# Patient Record
Sex: FEMALE | Race: AMERICAN INDIAN OR ALASKA NATIVE
[De-identification: names, ages, dates, MRNs, and addresses within clinical notes are randomized per-mention and may not be internally consistent; named-entity substitution may affect disease eponyms.]

---

## 2017-02-20 ENCOUNTER — HOSPITAL ENCOUNTER (INPATIENT)
Dept: HOSPITAL 5 - ED | Age: 42
LOS: 3 days | Discharge: PSYCHIATRIC OR SUBSTANCE USE FACILITY | DRG: 917 | End: 2017-02-23
Attending: INTERNAL MEDICINE | Admitting: INTERNAL MEDICINE
Payer: MEDICARE

## 2017-02-20 DIAGNOSIS — F23: ICD-10-CM

## 2017-02-20 DIAGNOSIS — Z82.49: ICD-10-CM

## 2017-02-20 DIAGNOSIS — G92: ICD-10-CM

## 2017-02-20 DIAGNOSIS — I10: ICD-10-CM

## 2017-02-20 DIAGNOSIS — Z90.710: ICD-10-CM

## 2017-02-20 DIAGNOSIS — E87.6: ICD-10-CM

## 2017-02-20 DIAGNOSIS — T50.902A: Primary | ICD-10-CM

## 2017-02-20 DIAGNOSIS — Z83.3: ICD-10-CM

## 2017-02-20 LAB
ALBUMIN SERPL-MCNC: 4.4 G/DL (ref 3.9–5)
ALBUMIN/GLOB SERPL: 1.5 %
ALP SERPL-CCNC: 64 UNITS/L (ref 35–129)
ALT SERPL-CCNC: 20 UNITS/L (ref 7–56)
ANION GAP SERPL CALC-SCNC: 20 MMOL/L
BASOPHILS NFR BLD AUTO: 0.4 % (ref 0–1.8)
BILIRUB SERPL-MCNC: 0.8 MG/DL (ref 0.1–1.2)
BILIRUB UR QL STRIP: (no result)
BLOOD UR QL VISUAL: (no result)
BUN SERPL-MCNC: 11 MG/DL (ref 7–17)
BUN/CREAT SERPL: 13.75 %
CALCIUM SERPL-MCNC: 9.8 MG/DL (ref 8.4–10.2)
CHLORIDE SERPL-SCNC: 94.6 MMOL/L (ref 98–107)
CO2 SERPL-SCNC: 27 MMOL/L (ref 22–30)
EOSINOPHIL NFR BLD AUTO: 0.1 % (ref 0–4.3)
GLUCOSE SERPL-MCNC: 120 MG/DL (ref 65–100)
HCT VFR BLD CALC: 42.9 % (ref 30.3–42.9)
HGB BLD-MCNC: 14 GM/DL (ref 10.1–14.3)
KETONES UR STRIP-MCNC: (no result) MG/DL
LEUKOCYTE ESTERASE UR QL STRIP: (no result)
MAGNESIUM SERPL-MCNC: 1.9 MG/DL (ref 1.7–2.3)
MCH RBC QN AUTO: 29 PG (ref 28–32)
MCHC RBC AUTO-ENTMCNC: 33 % (ref 30–34)
MCV RBC AUTO: 88 FL (ref 79–97)
MUCOUS THREADS #/AREA URNS HPF: (no result) /HPF
NITRITE UR QL STRIP: (no result)
PH UR STRIP: 6 [PH] (ref 5–7)
PLATELET # BLD: 407 K/MM3 (ref 140–440)
POTASSIUM SERPL-SCNC: 3.1 MMOL/L (ref 3.6–5)
PROT SERPL-MCNC: 7.4 G/DL (ref 6.3–8.2)
PROT UR STRIP-MCNC: (no result) MG/DL
RBC # BLD AUTO: 4.86 M/MM3 (ref 3.65–5.03)
RBC #/AREA URNS HPF: 1 /HPF (ref 0–6)
SODIUM SERPL-SCNC: 138 MMOL/L (ref 137–145)
URINE DRUGS OF ABUSE NOTE: (no result)
UROBILINOGEN UR-MCNC: < 2 MG/DL (ref ?–2)
WBC # BLD AUTO: 6.8 K/MM3 (ref 4.5–11)
WBC #/AREA URNS HPF: 1 /HPF (ref 0–6)

## 2017-02-20 PROCEDURE — 82140 ASSAY OF AMMONIA: CPT

## 2017-02-20 PROCEDURE — 90686 IIV4 VACC NO PRSV 0.5 ML IM: CPT

## 2017-02-20 PROCEDURE — 80048 BASIC METABOLIC PNL TOTAL CA: CPT

## 2017-02-20 PROCEDURE — 80307 DRUG TEST PRSMV CHEM ANLYZR: CPT

## 2017-02-20 PROCEDURE — 80320 DRUG SCREEN QUANTALCOHOLS: CPT

## 2017-02-20 PROCEDURE — 36415 COLL VENOUS BLD VENIPUNCTURE: CPT

## 2017-02-20 PROCEDURE — 83735 ASSAY OF MAGNESIUM: CPT

## 2017-02-20 PROCEDURE — 80053 COMPREHEN METABOLIC PANEL: CPT

## 2017-02-20 PROCEDURE — 84443 ASSAY THYROID STIM HORMONE: CPT

## 2017-02-20 PROCEDURE — 87040 BLOOD CULTURE FOR BACTERIA: CPT

## 2017-02-20 PROCEDURE — 96374 THER/PROPH/DIAG INJ IV PUSH: CPT

## 2017-02-20 PROCEDURE — 81001 URINALYSIS AUTO W/SCOPE: CPT

## 2017-02-20 PROCEDURE — 93005 ELECTROCARDIOGRAM TRACING: CPT

## 2017-02-20 PROCEDURE — 82550 ASSAY OF CK (CPK): CPT

## 2017-02-20 PROCEDURE — 84703 CHORIONIC GONADOTROPIN ASSAY: CPT

## 2017-02-20 PROCEDURE — 85025 COMPLETE CBC W/AUTO DIFF WBC: CPT

## 2017-02-20 PROCEDURE — 82962 GLUCOSE BLOOD TEST: CPT

## 2017-02-20 PROCEDURE — 96375 TX/PRO/DX INJ NEW DRUG ADDON: CPT

## 2017-02-20 PROCEDURE — 93010 ELECTROCARDIOGRAM REPORT: CPT

## 2017-02-20 PROCEDURE — 96361 HYDRATE IV INFUSION ADD-ON: CPT

## 2017-02-20 PROCEDURE — G0480 DRUG TEST DEF 1-7 CLASSES: HCPCS

## 2017-02-20 PROCEDURE — 70450 CT HEAD/BRAIN W/O DYE: CPT

## 2017-02-20 RX ADMIN — DOCUSATE SODIUM SCH: 100 CAPSULE, LIQUID FILLED ORAL at 23:03

## 2017-02-20 RX ADMIN — FAMOTIDINE SCH: 20 TABLET ORAL at 23:04

## 2017-02-20 NOTE — HISTORY AND PHYSICAL REPORT
History of Present Illness


Date of examination: 02/20/17


Date of admission: 





02/2017


Chief complaint: 





Altered mental status


History of present illness: 





The patient is a 41-year-old female who presents via EMS for evaluation of 

altered mental status.  The patient's  was at bedside and history 

obtained from him.  He states that the patient was found to have altered mental 

status by law enforcement after pulling her vehicle to the side of the road 

earlier today.  She subsequently passed out and became unresponsive.  Law force 

went inform the patient's  and they suspected the patient overdosed on 

prescription pills.  The  states that the patient reported homicidal and 

suicidal ideations during the past 48 hours, and that she has a history of 

homicidal ideations and multiple suicide attempt in the past. In the Er she 

remained in deep sleep and very difficult to arouse to get any history. CT head 

was unremarkable and UDS was negative.





Past medical History: h/o schizophrenia, HTN, multiple suicidal attempt in the 

past.





Past surgical History: s/p hysterectomy





Social History: Lives with family, denies any smoking, drinking and elicit drug 

abuse.





Family History: Significant for DM and HTN in mother





Medications and Allergies


 Allergies











Allergy/AdvReac Type Severity Reaction Status Date / Time


 


No Known Allergies Allergy   Unverified 12/31/15 09:34











 Home Medications











 Medication  Instructions  Recorded  Confirmed  Last Taken  Type


 


Benztropine [Cogentin] 2 mg PO DAILY 02/20/17 02/20/17 Unknown History


 


Hydrochlorothiazide [HCTZ] 25 mg PO QDAY 02/20/17 02/20/17 Unknown History


 


LORazepam [Ativan] 1 mg PO BID 02/20/17 02/20/17 Unknown History


 


Olanzapine [OLANZapine] 5 mg PO DAILY 02/20/17 02/20/17 Unknown History


 


Perphenazine 4 mg PO DAILY 02/20/17 02/20/17 Unknown History














Review of Systems


ROS unobtainable: due to mental status





Exam





- Physical Exam


Narrative exam: 





GENERAL: This is well-developed well-nourished AAF lying on bed appeared to be 

in no discomfort. Nonverbal with close eyes.


HEENT: Normocephalic.  Atraumatic. External auditory canal and nares patent 

bilaterally.


NECK: Supple.  Trachea midline. No JVD, thyromagaly or lymphadenopathy.


CHEST/LUNGS: Clear to auscultated bilaterally.  There is no respiratory 

distress noted, breathing nonlabored. No wheezes crackles or rhonchi.


HEART/CARDIOVASCULAR: Regular in rate and rhythm.  PMI at the apex.  There is 

no gallop rub or murmur.


ABDOMEN: Abdomen is soft, nontender.  Patient has normal bowel sounds.  There 

is no abdominal distention. No organomagaly or rigidity.


SKIN: There is no rash,  no erythrema.  There is no diaphoresis. Warm and dry.


NEUROLOGY: uncooperative and remained nonverbal with closed eyes


MUSCULOSKELETAL: No joint effusion or tenderness.  No muscle wasting. 


EXTRIMITY: No edema, cyanosis or clubbing.


PSYCH: does not answer any question.





- Constitutional


Vitals: 


 











Temp Pulse Resp BP Pulse Ox


 


 99.4 F   121 H  11 L  147/77   99 


 


 02/20/17 14:59  02/20/17 17:03  02/20/17 17:03  02/20/17 17:03  02/20/17 17:03














Results





- Labs


CBC & Chem 7: 


 02/21/17 05:35





 02/21/17 05:35


Labs: 


 Laboratory Last Values











WBC  6.8 K/mm3 (4.5-11.0)   02/20/17  16:06    


 


RBC  4.86 M/mm3 (3.65-5.03)   02/20/17  16:06    


 


Hgb  14.0 gm/dl (10.1-14.3)   02/20/17  16:06    


 


Hct  42.9 % (30.3-42.9)   02/20/17  16:06    


 


MCV  88 fl (79-97)   02/20/17  16:06    


 


MCH  29 pg (28-32)   02/20/17  16:06    


 


MCHC  33 % (30-34)   02/20/17  16:06    


 


RDW  13.5 % (13.2-15.2)   02/20/17  16:06    


 


Plt Count  407 K/mm3 (140-440)   02/20/17  16:06    


 


Lymph % (Auto)  21.3 % (13.4-35.0)   02/20/17  16:06    


 


Mono % (Auto)  10.6 % (0.0-7.3)  H  02/20/17  16:06    


 


Eos % (Auto)  0.1 % (0.0-4.3)   02/20/17  16:06    


 


Baso % (Auto)  0.4 % (0.0-1.8)   02/20/17  16:06    


 


Lymph #  1.5 K/mm3 (1.2-5.4)   02/20/17  16:06    


 


Mono #  0.7 K/mm3 (0.0-0.8)   02/20/17  16:06    


 


Eos #  0.0 K/mm3 (0.0-0.4)   02/20/17  16:06    


 


Baso #  0.0 K/mm3 (0.0-0.1)   02/20/17  16:06    


 


Seg Neutrophils %  67.6 % (40.0-70.0)   02/20/17  16:06    


 


Seg Neutrophils #  4.6 K/mm3 (1.8-7.7)   02/20/17  16:06    


 


Sodium  138 mmol/L (137-145)   02/20/17  16:06    


 


Potassium  3.1 mmol/L (3.6-5.0)  L  02/20/17  16:06    


 


Chloride  94.6 mmol/L ()  L  02/20/17  16:06    


 


Carbon Dioxide  27 mmol/L (22-30)   02/20/17  16:06    


 


Anion Gap  20 mmol/L  02/20/17  16:06    


 


BUN  11 mg/dL (7-17)   02/20/17  16:06    


 


Creatinine  0.8 mg/dL (0.7-1.2)   02/20/17  16:06    


 


Estimated GFR  > 60 ml/min  02/20/17  16:06    


 


BUN/Creatinine Ratio  13.75 %  02/20/17  16:06    


 


Glucose  120 mg/dL ()  H  02/20/17  16:06    


 


Lactic Acid  1.6 mmol/L (0.7-2.0)   02/20/17  16:06    


 


Calcium  9.8 mg/dL (8.4-10.2)   02/20/17  16:06    


 


Magnesium  1.9 mg/dL (1.7-2.3)   02/20/17  16:06    


 


Total Bilirubin  0.8 mg/dL (0.1-1.2)   02/20/17  16:06    


 


AST  27 units/L (5-40)   02/20/17  16:06    


 


ALT  20 units/L (7-56)   02/20/17  16:06    


 


Alkaline Phosphatase  64 units/L ()   02/20/17  16:06    


 


Total Protein  7.4 g/dL (6.3-8.2)   02/20/17  16:06    


 


Albumin  4.4 g/dL (3.9-5)   02/20/17  16:06    


 


Albumin/Globulin Ratio  1.5 %  02/20/17  16:06    


 


TSH  0.712 mlU/mL (0.270-4.200)   02/20/17  16:06    


 


HCG, Qual  Negative  (Negative)   02/20/17  16:06    


 


Urine Color  Yellow  (Yellow)   02/20/17  18:16    


 


Urine Turbidity  Clear  (Clear)   02/20/17  18:16    


 


Urine pH  6.0  (5.0-7.0)   02/20/17  18:16    


 


Ur Specific Gravity  1.011  (1.003-1.030)   02/20/17  18:16    


 


Urine Protein  <15 mg/dl mg/dL (Negative)   02/20/17  18:16    


 


Urine Glucose (UA)  Neg mg/dL (Negative)   02/20/17  18:16    


 


Urine Ketones  Tr mg/dL (Negative)   02/20/17  18:16    


 


Urine Blood  Neg  (Negative)   02/20/17  18:16    


 


Urine Nitrite  Neg  (Negative)   02/20/17  18:16    


 


Urine Bilirubin  Neg  (Negative)   02/20/17  18:16    


 


Urine Urobilinogen  < 2.0 mg/dL (<2.0)   02/20/17  18:16    


 


Ur Leukocyte Esterase  Neg  (Negative)   02/20/17  18:16    


 


Urine WBC (Auto)  1.0 /HPF (0.0-6.0)   02/20/17  18:16    


 


Urine RBC (Auto)  1.0 /HPF (0.0-6.0)   02/20/17  18:16    


 


U Epithel Cells (Auto)  < 1.0 /HPF (0-13.0)   02/20/17  18:16    


 


Urine Mucus  Few /HPF  02/20/17  18:16    


 


Salicylates  < 0.3 mg/dL (2.8-20.0)  L  02/20/17  16:06    


 


Urine Opiates Screen  Presumptive negative   02/20/17  18:16    


 


Urine Methadone Screen  Presumptive negative   02/20/17  18:16    


 


Acetaminophen  < 15.0 ug/mL (10.0-30.0)   02/20/17  16:06    


 


Ur Barbiturates Screen  Presumptive negative   02/20/17  18:16    


 


Ur Phencyclidine Scrn  Presumptive negative   02/20/17  18:16    


 


Ur Amphetamines Screen  Presumptive negative   02/20/17  18:16    


 


U Benzodiazepines Scrn  Presumptive negative   02/20/17  18:16    


 


Urine Cocaine Screen  Presumptive negative   02/20/17  18:16    


 


U Marijuana (THC) Screen  Presumptive negative   02/20/17  18:16    


 


Drugs of Abuse Note  Disclamer   02/20/17  18:16    


 


Plasma/Serum Alcohol  < 0.01 gm% (0-0.07)   02/20/17  16:06    














- Imaging and Cardiology


CT Scan - head: report reviewed





Assessment and Plan


Assessment and plan: 





Acute encephalopathy likely toxic


History of schizophrenia


History of multiple suicidal attempts in the past


Possible drug overdose


Hypokalemia, likley due to poor intake





Plan:


Admit to medicine


Place on IV fluid hydration and supportive care


Mental health consultation


Place on 1013


GI and DVT prophylaxis


replace electrolytes








Advance Directives: Yes


Plan of care discussed with patient/family: Yes

## 2017-02-20 NOTE — ADMIT CRITERIA FORM
<FERNANDO AREVALO - Last Filed: 02/20/17 17:46>


Admission Criteria Documentation: 





                                           MENTAL STATUS CHANGE





   Clinical Indications for Inpatient Care                                     

                                    


                                                                 (Place 'X' for 

any and all applicable criteria):  





Ongoing inpatient care may be needed for ANY ONE of the following(1)(2)(3)(5)(6)

: 





[X ]I.    Suspected serious etiology (eg, medical disorder, CNS event) of 

mental status change


[ X]II.   Danger to self or others not manageable at lower level of care


[ ]III.  Grave disability (eg, inability to perform self care necessary at 

lower level of care)


[ ]IV. Agitation or inappropriate behavior interfering with care for primary 

condition (eg, attempting to discontinue


        lines or drains prematurely, unable to cooperate with respiratory care)


[ ]V.  Delirium [A] [D][E] as described by ANY ONE of the following(26):


         [ ]a)  Delirium due to alcohol or sedative [F] withdrawal


         [ ]b)  Delirium of uncertain etiology that has not responded to 

appropriate empiric treatment


         [ ]c)  Delirium that prevents performance of a life-sustaining 

function (eg, feeding or hydrating oneself)





[ X]VI.  General contraindications and/or Inappropriate clinical situations for 

Observational Care in patients


          with Mental Status Change, when ANY ONE of the following is required:


          [ X]a)   Prediction of prolongation of LOS based on ANY ONE of the 

following may be considered as 


                    a contraindication for observational care 2, 3, 4, 5, 6, 7, 

8, 9, 10, 11


                    [ ]i)    Age > 65 yrs.


                    [ X]ii)   Patient arriving by ambulance


                    [ ]iii)  Patient with high acuity


                    [ ]iv)  Patient requiring vital sign monitoring


                    [ ]v)   Patient on IV medication


          [ ]b)   Systolic blood pressures  180mmHg 3,12


          [ ]c)   Patient with altered mental status including delirium and 

other alteration of consciousness, (3)


          [ ]d)   Patient whose discharge disposition will be to a skilled 

nursing home or rehabilitation home should 


                   not be managed in Emergency Department Observation Unit. CMS 

rule requires 3 days hospital stay before such placement.3,13


          [ ]e)   Patient with failure to thrive due to broad array of 

etiologies 3,16,17


          [ ]f)   Inability to ambulate 3,14








Extended stay beyond goal length of stay for the primary condition may be 

needed until ALL of the following are present(3)(5):


[ ]a)  Underlying medical etiology of mental status change is absent, or has 

been established and adequately treated


[ ]b)  Danger to self or others is absent or manageable at lower level of care.


[ ]c)  Behavior crisis management, including physical or chemical restraints, 

is not required or available at lower level of car


[ ]d)  Substance or alcohol withdrawal is absent or manageable at lower level 

of care.


[ ]e)  Behavioral symptoms (eg, agitation, somnolence, inappropriate behavior) 

are absent, or are manageable at lower level of care.








The original [facility] [employer] content created by [facility] has been revised. 


The portions of the content which have been revised are identified through the 

use of italic text or in bold, and [facility] 


has neither reviewed nor approved the modified material. All other unmodified 

content is copyright  [facility].





Please see references footnoted in the original [facility] edition 

2016


Admission Criteria Met: Yes





<OLIVIA REYNOSO - Last Filed: 02/20/17 18:48>


Admission Criteria Met: Yes

## 2017-02-20 NOTE — CAT SCAN REPORT
FINAL REPORT



EXAM:  CT HEAD/BRAIN WO CON



HISTORY:  headache 



TECHNIQUE:  Noncontrast serial axial images from skull base to

vertex. 



PRIORS:  None.



FINDINGS:  

There is no mass effect or midline shift.  There are no abnormal

intra or extra-axial fluid collections.  Cortical sulci and

lateral ventricles are within normal limits for size and

configuration.  Basilar cisterns are patent. No acute

intracranial hemorrhage is identified. 



Visualized paranasal sinuses and mastoid air cells are well

aerated. 



No acute osseous abnormality is identified. 



IMPRESSION:  

1. No abnormal mass or acute intracranial hemorrhage is

identified.

## 2017-02-20 NOTE — ADMIT CRITERIA FORM
Admission Criteria Documentation: 





                                           MENTAL STATUS CHANGE





   Clinical Indications for Inpatient Care                                     

                                    


                                                                 (Place 'X' for 

any and all applicable criteria):  





Ongoing inpatient care may be needed for ANY ONE of the following(1)(2)(3)(5)(6)

: 





[X ]I.    Suspected serious etiology (eg, medical disorder, CNS event) of 

mental status change


[X ]II.   Danger to self or others not manageable at lower level of care


[ ]III.  Grave disability (eg, inability to perform self care necessary at 

lower level of care)


[ ]IV. Agitation or inappropriate behavior interfering with care for primary 

condition (eg, attempting to discontinue


        lines or drains prematurely, unable to cooperate with respiratory care)


[ ]V.  Delirium [A] [D][E] as described by ANY ONE of the following(26):


         [ ]a)  Delirium due to alcohol or sedative [F] withdrawal


         [ ]b)  Delirium of uncertain etiology that has not responded to 

appropriate empiric treatment


         [ ]c)  Delirium that prevents performance of a life-sustaining 

function (eg, feeding or hydrating oneself)





[X ]VI.  General contraindications and/or Inappropriate clinical situations for 

Observational Care in patients


          with Mental Status Change, when ANY ONE of the following is required:


          [X ]a)   Prediction of prolongation of LOS based on ANY ONE of the 

following may be considered as 


                    a contraindication for observational care 2, 3, 4, 5, 6, 7, 

8, 9, 10, 11


                    [ ]i)    Age > 65 yrs.


                    [X ]ii)   Patient arriving by ambulance


                    [ ]iii)  Patient with high acuity


                    [ ]iv)  Patient requiring vital sign monitoring


                    [ ]v)   Patient on IV medication


          [ ]b)   Systolic blood pressures  180mmHg 3,12


          [ ]c)   Patient with altered mental status including delirium and 

other alteration of consciousness, (3)


          [ ]d)   Patient whose discharge disposition will be to a skilled 

nursing home or rehabilitation home should 


                   not be managed in Emergency Department Observation Unit. CMS 

rule requires 3 days hospital stay before such placement.3,13


          [ ]e)   Patient with failure to thrive due to broad array of 

etiologies 3,16,17


          [ ]f)   Inability to ambulate 3,14








Extended stay beyond goal length of stay for the primary condition may be 

needed until ALL of the following are present(3)(5):


[ ]a)  Underlying medical etiology of mental status change is absent, or has 

been established and adequately treated


[ ]b)  Danger to self or others is absent or manageable at lower level of care.


[ ]c)  Behavior crisis management, including physical or chemical restraints, 

is not required or available at lower level of car


[ ]d)  Substance or alcohol withdrawal is absent or manageable at lower level 

of care.


[ ]e)  Behavioral symptoms (eg, agitation, somnolence, inappropriate behavior) 

are absent, or are manageable at lower level of care.








The original [facility] [employer] content created by [facility] 

[employer] has been revised. 


The portions of the content which have been revised are identified through the 

use of italic text or in bold, and [facility] 


has neither reviewed nor approved the modified material. All other unmodified 

content is copyright  [facility].





Please see references footnoted in the original [facility] edition 

2016


Admission Criteria Met: Yes

## 2017-02-20 NOTE — EMERGENCY DEPARTMENT REPORT
HPI





- General


Chief Complaint: Altered Mental Status


Time Seen by Provider: 02/20/17 15:53





- HPI


HPI: 





The patient is a 41-year-old female who presents via EMS for evaluation of 

mental status.  The patient's  provides history present illness.  He 

states that she was informed that the patient was found to have altered mental 

status by law enforcement after pulling her vehicle to the side of the road 

earlier today.  She subsequently passed out and became unresponsive.  Law force 

went inform the patient's  and they suspected the patient overdosed on 

prescription pills.  The  states that the patient reported homicidal 

ideations during the past 48 hours, and that she has a history of homicidal 

ideations and suicide attempt in the past. 








ED Past Medical Hx





- Social History


Smoking Status: Unknown if ever smoked





ED Review of Systems


ROS: 


Stated complaint: ALTERED LOC/POSSIBLE DRUGOVERDOSE


Other details as noted in HPI





Comment: Unobtainable due to pts medical conditions (unresponsive)





Physical Exam





- Physical Exam


Vital Signs: 


 Vital Signs











  02/20/17





  14:59


 


Temperature 99.4 F


 


Pulse Rate 125 H


 


Respiratory 22





Rate 


 


Blood Pressure 154/91


 


O2 Sat by Pulse 100





Oximetry 











Physical Exam: 








General: well-nourished, well-developed, no acute distress


Head: Normocephalic, atraumatic


Eyes: normal sclera


ENT: Mucous membranes are pale and dry


Neck: trachea midline, neck supple, No neck stiffness, no cervical adenopathy


Respiratory: Breath sounds equal bilaterally, no wheezing, rales, or rhonchi


Cardio: S1 and S2 present, no murmurs, rubs, gallops, capillary refill is 

delayed


Abdomen: Normoactive bowel sounds, soft abdomen, no rigidity, no guarding or 

rebound tenderness


Musc: No pitting edema


Skin: No rash


Neuro: Unresponsive to verbal stimuli, patient responsive to tactile stimuli, 

no facial drooping, gag reflex intact, patient protecting airway, reflexes 2+ 

symmetric on DTR testing, no obvious gross neuro deficits


Psych: Normal affect











ED Course


 Vital Signs











  02/20/17





  14:59


 


Temperature 99.4 F


 


Pulse Rate 125 H


 


Respiratory 22





Rate 


 


Blood Pressure 154/91


 


O2 Sat by Pulse 100





Oximetry 














ED Medical Decision Making





- Lab Data


Result diagrams: 


 02/20/17 16:06





 02/20/17 16:06





- Medical Decision Making





The patient was seen and examined by myself.  The patient is placed on a 

cardiac monitor and continuous pulse ox. On initial evaluation, the patient was 

found to be in no distress. Evaluation orders were placed.  The patient is 

given 2 L normal saline fluid bolus for treatment of dehydration and 

tachycardia.  The patient was given Narcan with minimal and transient 

improvement in drowsiness.  EKG exhibited sinus tachycardia with normal axis, 

normal rhythm, no QT interval prolongation.  Lab results were nonrevealing, 

including negative Tylenol and salicylate levels.  The patient was monitored in 

the emergency department for greater than 2 hours.  The patient remains with 

severe CNS depression, she'll be admitted for close cardiopulmonary monitoring. 

The on-call hospitalist service was contacted.  They agreed to admit the 

patient for further treatment and close monitoring.  The ED admit order was 

placed.  The patient was admitted in guarded condition.





Critical care attestation.: 


If time is entered above; I have spent that time in minutes in the direct care 

of this critically ill patient, excluding procedure time.








ED Disposition


Clinical Impression: 


 Dehydration, Suicidal behavior





Altered mental status


Qualifiers:


 Altered mental status type: stupor Qualified Code(s): R40.1 - Stupor





Overdose


Qualifiers:


 Encounter type: initial encounter Injury intent: undetermined intent Qualified 

Code(s): T50.904A - Poisoning by unspecified drugs, medicaments and biological 

substances, undetermined, initial encounter





Disposition: OP ADMITTED AS IP TO THIS HOSP


Is pt being admited?: Yes


Does the pt Need Aspirin: Yes


Condition: Serious


Referrals: 


PRIMARY CARE,MD [Primary Care Provider] - 3-5 Days


Time of Disposition: 17:02

## 2017-02-21 LAB
ANION GAP SERPL CALC-SCNC: 18 MMOL/L
BASOPHILS NFR BLD AUTO: 0.7 % (ref 0–1.8)
BUN SERPL-MCNC: 7 MG/DL (ref 7–17)
BUN/CREAT SERPL: 11.66 %
CALCIUM SERPL-MCNC: 8.3 MG/DL (ref 8.4–10.2)
CHLORIDE SERPL-SCNC: 103 MMOL/L (ref 98–107)
CO2 SERPL-SCNC: 23 MMOL/L (ref 22–30)
EOSINOPHIL NFR BLD AUTO: 0.2 % (ref 0–4.3)
GLUCOSE SERPL-MCNC: 123 MG/DL (ref 65–100)
HCT VFR BLD CALC: 36.9 % (ref 30.3–42.9)
HGB BLD-MCNC: 12.4 GM/DL (ref 10.1–14.3)
MCH RBC QN AUTO: 29 PG (ref 28–32)
MCHC RBC AUTO-ENTMCNC: 34 % (ref 30–34)
MCV RBC AUTO: 87 FL (ref 79–97)
PLATELET # BLD: 388 K/MM3 (ref 140–440)
POTASSIUM SERPL-SCNC: 3.3 MMOL/L (ref 3.6–5)
RBC # BLD AUTO: 4.24 M/MM3 (ref 3.65–5.03)
SODIUM SERPL-SCNC: 141 MMOL/L (ref 137–145)
WBC # BLD AUTO: 8.4 K/MM3 (ref 4.5–11)

## 2017-02-21 RX ADMIN — LORAZEPAM PRN MG: 2 INJECTION INTRAMUSCULAR; INTRAVENOUS at 22:54

## 2017-02-21 RX ADMIN — FAMOTIDINE SCH MG: 20 TABLET ORAL at 10:24

## 2017-02-21 RX ADMIN — DOCUSATE SODIUM SCH MG: 100 CAPSULE, LIQUID FILLED ORAL at 10:24

## 2017-02-21 RX ADMIN — SODIUM CHLORIDE AND POTASSIUM CHLORIDE SCH MLS/HR: 9; 2.98 INJECTION, SOLUTION INTRAVENOUS at 06:52

## 2017-02-21 RX ADMIN — DOCUSATE SODIUM SCH: 100 CAPSULE, LIQUID FILLED ORAL at 22:55

## 2017-02-21 RX ADMIN — FAMOTIDINE SCH: 20 TABLET ORAL at 22:54

## 2017-02-21 RX ADMIN — SODIUM CHLORIDE AND POTASSIUM CHLORIDE SCH MLS/HR: 9; 2.98 INJECTION, SOLUTION INTRAVENOUS at 18:29

## 2017-02-21 RX ADMIN — ENOXAPARIN SODIUM SCH MG: 100 INJECTION SUBCUTANEOUS at 10:24
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
## 2017-02-21 NOTE — PROGRESS NOTE
Assessment and Plan


Assessment and plan: 





Acute encephalopathy likely toxic, improved


Acute psychosis


Drug overdose for suicidal attempt


History of schizophrenia


History of multiple suicidal attempts in the past


Hypokalemia, due to poor oral intake








Plan:


continue to monitor


cont on IV fluid hydration and supportive care


follow Mental health recommendation


cont on 1013


GI and DVT prophylaxis


replace electrolyte as needed











History


Interval history: 





Patient seen and examined.  Medical records and medication list reviewed.  


No acute event overnight noted by the RN.  


Patient on restrain, mental status improved per family but still uncooperative 

and remained nonverbal with closed eyes


Discussed plan of care at bedside with patient's family.





Hospitalist Physical





- Physical exam


Narrative exam: 





GENERAL: This is well-developed well-nourished AAF lying on bed appeared to be 

in no discomfort. Nonverbal with close eyes.


HEENT: Normocephalic.  Atraumatic. External auditory canal and nares patent 

bilaterally.


NECK: Supple.  Trachea midline. No JVD, thyromagaly or lymphadenopathy.


CHEST/LUNGS: Clear to auscultated bilaterally.  There is no respiratory 

distress noted, breathing nonlabored. No wheezes crackles or rhonchi.


HEART/CARDIOVASCULAR: Regular in rate and rhythm.  PMI at the apex.  There is 

no gallop rub or murmur.


ABDOMEN: Abdomen is soft, nontender.  Patient has normal bowel sounds.  There 

is no abdominal distention. No organomagaly or rigidity.


SKIN: There is no rash,  no erythrema.  There is no diaphoresis. Warm and dry.


NEUROLOGY: uncooperative and remained nonverbal with closed eyes


MUSCULOSKELETAL: No joint effusion or tenderness.  No muscle wasting. 


EXTRIMITY: No edema, cyanosis or clubbing.


PSYCH: does not answer any question.





- Constitutional


Vitals: 


 











Temp Pulse Resp BP Pulse Ox


 


 97.3 F L  121 H  20   185/95   98 


 


 02/21/17 07:25  02/21/17 12:00  02/21/17 12:00  02/21/17 12:00  02/21/17 12:00














Results





- Labs


CBC & Chem 7: 


 02/21/17 05:35





 02/21/17 05:35


Labs: 


 Laboratory Last Values











WBC  8.4 K/mm3 (4.5-11.0)   02/21/17  05:35    


 


RBC  4.24 M/mm3 (3.65-5.03)   02/21/17  05:35    


 


Hgb  12.4 gm/dl (10.1-14.3)   02/21/17  05:35    


 


Hct  36.9 % (30.3-42.9)  D 02/21/17  05:35    


 


MCV  87 fl (79-97)   02/21/17  05:35    


 


MCH  29 pg (28-32)   02/21/17  05:35    


 


MCHC  34 % (30-34)   02/21/17  05:35    


 


RDW  13.3 % (13.2-15.2)   02/21/17  05:35    


 


Plt Count  388 K/mm3 (140-440)   02/21/17  05:35    


 


Lymph % (Auto)  16.4 % (13.4-35.0)   02/21/17  05:35    


 


Mono % (Auto)  9.2 % (0.0-7.3)  H  02/21/17  05:35    


 


Eos % (Auto)  0.2 % (0.0-4.3)   02/21/17  05:35    


 


Baso % (Auto)  0.7 % (0.0-1.8)   02/21/17  05:35    


 


Lymph #  1.4 K/mm3 (1.2-5.4)   02/21/17  05:35    


 


Mono #  0.8 K/mm3 (0.0-0.8)   02/21/17  05:35    


 


Eos #  0.0 K/mm3 (0.0-0.4)   02/21/17  05:35    


 


Baso #  0.1 K/mm3 (0.0-0.1)   02/21/17  05:35    


 


Seg Neutrophils %  73.5 % (40.0-70.0)  H  02/21/17  05:35    


 


Seg Neutrophils #  6.2 K/mm3 (1.8-7.7)   02/21/17  05:35    


 


Sodium  141 mmol/L (137-145)   02/21/17  05:35    


 


Potassium  3.3 mmol/L (3.6-5.0)  L  02/21/17  05:35    


 


Chloride  103.0 mmol/L ()   02/21/17  05:35    


 


Carbon Dioxide  23 mmol/L (22-30)   02/21/17  05:35    


 


Anion Gap  18 mmol/L  02/21/17  05:35    


 


BUN  7 mg/dL (7-17)   02/21/17  05:35    


 


Creatinine  0.6 mg/dL (0.7-1.2)  L  02/21/17  05:35    


 


Estimated GFR  > 60 ml/min  02/21/17  05:35    


 


BUN/Creatinine Ratio  11.66 %  02/21/17  05:35    


 


Glucose  123 mg/dL ()  H  02/21/17  05:35    


 


Lactic Acid  1.7 mmol/L (0.7-2.0)   02/20/17  21:04    


 


Calcium  8.3 mg/dL (8.4-10.2)  L D 02/21/17  05:35    


 


Magnesium  1.9 mg/dL (1.7-2.3)   02/20/17  16:06    


 


Total Bilirubin  0.8 mg/dL (0.1-1.2)   02/20/17  16:06    


 


AST  27 units/L (5-40)   02/20/17  16:06    


 


ALT  20 units/L (7-56)   02/20/17  16:06    


 


Alkaline Phosphatase  64 units/L ()   02/20/17  16:06    


 


Total Creatine Kinase  316 units/L ()  H  02/20/17  21:04    


 


Total Protein  7.4 g/dL (6.3-8.2)   02/20/17  16:06    


 


Albumin  4.4 g/dL (3.9-5)   02/20/17  16:06    


 


Albumin/Globulin Ratio  1.5 %  02/20/17  16:06    


 


TSH  0.712 mlU/mL (0.270-4.200)   02/20/17  16:06    


 


HCG, Qual  Negative  (Negative)   02/20/17  16:06    


 


Urine Color  Yellow  (Yellow)   02/20/17  18:16    


 


Urine Turbidity  Clear  (Clear)   02/20/17  18:16    


 


Urine pH  6.0  (5.0-7.0)   02/20/17  18:16    


 


Ur Specific Gravity  1.011  (1.003-1.030)   02/20/17  18:16    


 


Urine Protein  <15 mg/dl mg/dL (Negative)   02/20/17  18:16    


 


Urine Glucose (UA)  Neg mg/dL (Negative)   02/20/17  18:16    


 


Urine Ketones  Tr mg/dL (Negative)   02/20/17  18:16    


 


Urine Blood  Neg  (Negative)   02/20/17  18:16    


 


Urine Nitrite  Neg  (Negative)   02/20/17  18:16    


 


Urine Bilirubin  Neg  (Negative)   02/20/17  18:16    


 


Urine Urobilinogen  < 2.0 mg/dL (<2.0)   02/20/17  18:16    


 


Ur Leukocyte Esterase  Neg  (Negative)   02/20/17  18:16    


 


Urine WBC (Auto)  1.0 /HPF (0.0-6.0)   02/20/17  18:16    


 


Urine RBC (Auto)  1.0 /HPF (0.0-6.0)   02/20/17  18:16    


 


U Epithel Cells (Auto)  < 1.0 /HPF (0-13.0)   02/20/17  18:16    


 


Urine Mucus  Few /HPF  02/20/17  18:16    


 


Salicylates  < 0.3 mg/dL (2.8-20.0)  L  02/20/17  16:06    


 


Urine Opiates Screen  Presumptive negative   02/20/17  18:16    


 


Urine Methadone Screen  Presumptive negative   02/20/17  18:16    


 


Acetaminophen  < 15.0 ug/mL (10.0-30.0)   02/20/17  21:04    


 


Ur Barbiturates Screen  Presumptive negative   02/20/17  18:16    


 


Ur Phencyclidine Scrn  Presumptive negative   02/20/17  18:16    


 


Ur Amphetamines Screen  Presumptive negative   02/20/17  18:16    


 


U Benzodiazepines Scrn  Presumptive negative   02/20/17  18:16    


 


Urine Cocaine Screen  Presumptive negative   02/20/17  18:16    


 


U Marijuana (THC) Screen  Presumptive negative   02/20/17  18:16    


 


Drugs of Abuse Note  Disclamer   02/20/17  18:16    


 


Plasma/Serum Alcohol  < 0.01 gm% (0-0.07)   02/20/17  16:06

## 2017-02-22 RX ADMIN — FAMOTIDINE SCH MG: 20 TABLET ORAL at 21:21

## 2017-02-22 RX ADMIN — SODIUM CHLORIDE AND POTASSIUM CHLORIDE SCH MLS/HR: 9; 2.98 INJECTION, SOLUTION INTRAVENOUS at 16:57

## 2017-02-22 RX ADMIN — DOCUSATE SODIUM SCH MG: 100 CAPSULE, LIQUID FILLED ORAL at 10:15

## 2017-02-22 RX ADMIN — SODIUM CHLORIDE AND POTASSIUM CHLORIDE SCH MLS/HR: 9; 2.98 INJECTION, SOLUTION INTRAVENOUS at 07:04

## 2017-02-22 RX ADMIN — FAMOTIDINE SCH MG: 20 TABLET ORAL at 10:15

## 2017-02-22 RX ADMIN — LORAZEPAM PRN MG: 2 INJECTION INTRAMUSCULAR; INTRAVENOUS at 21:21

## 2017-02-22 RX ADMIN — ENOXAPARIN SODIUM SCH MG: 100 INJECTION SUBCUTANEOUS at 10:15

## 2017-02-22 RX ADMIN — DOCUSATE SODIUM SCH MG: 100 CAPSULE, LIQUID FILLED ORAL at 21:20

## 2017-02-22 NOTE — PROGRESS NOTE
Assessment and Plan


Assessment and plan: 





Acute encephalopathy likely toxic


History of schizophrenia


History of multiple suicidal attempts in the past


Possible drug overdose


Hypokalemia, silvanoley due to poor intake





Plan:


continue to monitor


cont on IV fluid hydration and supportive care


follow Mental health recommendation


Likely to start antipsychotic from tomorrow


cont on 1013


GI and DVT prophylaxis


replace electrolyte as needed











History


Interval history: 





Patient seen and examined.  Medical records and medication list reviewed.  


No acute event overnight noted by the RN.  


Her mental status improved and much cooperative and oriented today


Discussed plan of care at bedside with patient.





Hospitalist Physical





- Physical exam


Narrative exam: 





GENERAL: This is well-developed well-nourished AAF lying on bed appeared to be 

in no discomfort. 


HEENT: Normocephalic.  Atraumatic. External auditory canal and nares patent 

bilaterally.


NECK: Supple.  Trachea midline. No JVD, thyromagaly or lymphadenopathy.


CHEST/LUNGS: Clear to auscultated bilaterally.  There is no respiratory 

distress noted, breathing nonlabored. No wheezes crackles or rhonchi.


HEART/CARDIOVASCULAR: Regular in rate and rhythm.  PMI at the apex.  There is 

no gallop rub or murmur.


ABDOMEN: Abdomen is soft, nontender.  Patient has normal bowel sounds.  There 

is no abdominal distention. No organomagaly or rigidity.


SKIN: There is no rash,  no erythrema.  There is no diaphoresis. Warm and dry.


NEUROLOGY: no focal deficit


MUSCULOSKELETAL: No joint effusion or tenderness.  No muscle wasting. 


EXTRIMITY: No edema, cyanosis or clubbing.


PSYCH: cooperative.





- Constitutional


Vitals: 


 











Temp Pulse Resp BP Pulse Ox


 


 98.4 F   97 H  18   147/95   98 


 


 02/22/17 12:00  02/22/17 12:00  02/22/17 12:00  02/22/17 12:00  02/22/17 12:00














Results





- Labs


CBC & Chem 7: 


 02/21/17 05:35





 02/21/17 05:35


Labs: 


 Laboratory Last Values











WBC  8.4 K/mm3 (4.5-11.0)   02/21/17  05:35    


 


RBC  4.24 M/mm3 (3.65-5.03)   02/21/17  05:35    


 


Hgb  12.4 gm/dl (10.1-14.3)   02/21/17  05:35    


 


Hct  36.9 % (30.3-42.9)  D 02/21/17  05:35    


 


MCV  87 fl (79-97)   02/21/17  05:35    


 


MCH  29 pg (28-32)   02/21/17  05:35    


 


MCHC  34 % (30-34)   02/21/17  05:35    


 


RDW  13.3 % (13.2-15.2)   02/21/17  05:35    


 


Plt Count  388 K/mm3 (140-440)   02/21/17  05:35    


 


Lymph % (Auto)  16.4 % (13.4-35.0)   02/21/17  05:35    


 


Mono % (Auto)  9.2 % (0.0-7.3)  H  02/21/17  05:35    


 


Eos % (Auto)  0.2 % (0.0-4.3)   02/21/17  05:35    


 


Baso % (Auto)  0.7 % (0.0-1.8)   02/21/17  05:35    


 


Lymph #  1.4 K/mm3 (1.2-5.4)   02/21/17  05:35    


 


Mono #  0.8 K/mm3 (0.0-0.8)   02/21/17  05:35    


 


Eos #  0.0 K/mm3 (0.0-0.4)   02/21/17  05:35    


 


Baso #  0.1 K/mm3 (0.0-0.1)   02/21/17  05:35    


 


Seg Neutrophils %  73.5 % (40.0-70.0)  H  02/21/17  05:35    


 


Seg Neutrophils #  6.2 K/mm3 (1.8-7.7)   02/21/17  05:35    


 


Sodium  141 mmol/L (137-145)   02/21/17  05:35    


 


Potassium  3.3 mmol/L (3.6-5.0)  L  02/21/17  05:35    


 


Chloride  103.0 mmol/L ()   02/21/17  05:35    


 


Carbon Dioxide  23 mmol/L (22-30)   02/21/17  05:35    


 


Anion Gap  18 mmol/L  02/21/17  05:35    


 


BUN  7 mg/dL (7-17)   02/21/17  05:35    


 


Creatinine  0.6 mg/dL (0.7-1.2)  L  02/21/17  05:35    


 


Estimated GFR  > 60 ml/min  02/21/17  05:35    


 


BUN/Creatinine Ratio  11.66 %  02/21/17  05:35    


 


Glucose  123 mg/dL ()  H  02/21/17  05:35    


 


Lactic Acid  1.7 mmol/L (0.7-2.0)   02/20/17  21:04    


 


Calcium  8.3 mg/dL (8.4-10.2)  L D 02/21/17  05:35    


 


Magnesium  1.9 mg/dL (1.7-2.3)   02/20/17  16:06    


 


Total Bilirubin  0.8 mg/dL (0.1-1.2)   02/20/17  16:06    


 


AST  27 units/L (5-40)   02/20/17  16:06    


 


ALT  20 units/L (7-56)   02/20/17  16:06    


 


Alkaline Phosphatase  64 units/L ()   02/20/17  16:06    


 


Total Creatine Kinase  316 units/L ()  H  02/20/17  21:04    


 


Total Protein  7.4 g/dL (6.3-8.2)   02/20/17  16:06    


 


Albumin  4.4 g/dL (3.9-5)   02/20/17  16:06    


 


Albumin/Globulin Ratio  1.5 %  02/20/17  16:06    


 


TSH  0.712 mlU/mL (0.270-4.200)   02/20/17  16:06    


 


HCG, Qual  Negative  (Negative)   02/20/17  16:06    


 


Urine Color  Yellow  (Yellow)   02/20/17  18:16    


 


Urine Turbidity  Clear  (Clear)   02/20/17  18:16    


 


Urine pH  6.0  (5.0-7.0)   02/20/17  18:16    


 


Ur Specific Gravity  1.011  (1.003-1.030)   02/20/17  18:16    


 


Urine Protein  <15 mg/dl mg/dL (Negative)   02/20/17  18:16    


 


Urine Glucose (UA)  Neg mg/dL (Negative)   02/20/17  18:16    


 


Urine Ketones  Tr mg/dL (Negative)   02/20/17  18:16    


 


Urine Blood  Neg  (Negative)   02/20/17  18:16    


 


Urine Nitrite  Neg  (Negative)   02/20/17  18:16    


 


Urine Bilirubin  Neg  (Negative)   02/20/17  18:16    


 


Urine Urobilinogen  < 2.0 mg/dL (<2.0)   02/20/17  18:16    


 


Ur Leukocyte Esterase  Neg  (Negative)   02/20/17  18:16    


 


Urine WBC (Auto)  1.0 /HPF (0.0-6.0)   02/20/17  18:16    


 


Urine RBC (Auto)  1.0 /HPF (0.0-6.0)   02/20/17  18:16    


 


U Epithel Cells (Auto)  < 1.0 /HPF (0-13.0)   02/20/17  18:16    


 


Urine Mucus  Few /HPF  02/20/17  18:16    


 


Salicylates  < 0.3 mg/dL (2.8-20.0)  L  02/20/17  16:06    


 


Urine Opiates Screen  Presumptive negative   02/20/17  18:16    


 


Urine Methadone Screen  Presumptive negative   02/20/17  18:16    


 


Acetaminophen  < 15.0 ug/mL (10.0-30.0)   02/20/17  21:04    


 


Ur Barbiturates Screen  Presumptive negative   02/20/17  18:16    


 


Ur Phencyclidine Scrn  Presumptive negative   02/20/17  18:16    


 


Ur Amphetamines Screen  Presumptive negative   02/20/17  18:16    


 


U Benzodiazepines Scrn  Presumptive negative   02/20/17  18:16    


 


Urine Cocaine Screen  Presumptive negative   02/20/17  18:16    


 


U Marijuana (THC) Screen  Presumptive negative   02/20/17  18:16    


 


Drugs of Abuse Note  Disclamer   02/20/17  18:16    


 


Plasma/Serum Alcohol  < 0.01 gm% (0-0.07)   02/20/17  16:06

## 2017-02-22 NOTE — PROGRESS NOTE
Subjective





- Reason for Consult


Reason for consult: psych management





- Chief Complaint


Chief complaint: 


41 year old BF notes that she is getting better.  Today she was able to clarify 

on several issues that led her to this hospital.  Patient admits to taking a 

handful of pills- her reasoning was to help people- therefore she took the 

pills in the car and subsequently became sedated.  She did allude to voices 

telling her to do so.  She admits to a history of taking zyprexa and cogentin.  

She feels that he family tries to hard to get her to take the pills and she 

doesn't know why.  She denies taking them secondary to depression.  No SI/HI.  

No AH/VH.  She denies any paranoia.  She remains with disorganized thought 

process.  She is less verbally agitated and physically agitated.  She knows she'

s at [facility] but states that it is Feb 10th.





Mental Status Exam





- Vital signs


 Last Vital Signs











Temp  99.4 F   02/22/17 16:25


 


Pulse  18 L  02/22/17 16:25


 


Resp  20   02/22/17 16:25


 


BP  120/77   02/22/17 16:25


 


Pulse Ox  100   02/22/17 16:25














- Exam


Orientation: place, person


Affect: normal


Mood: anxious


Thought content: delusions


Thought Process: Circumstantial, Tangential, Disorganized


Perceptions: none


Speech: normal rate and pattern


Concentration: distractible


Motor activity: lethargic


Level of consciousness: other (less confused)


Interaction: cooperative





Assessment and Plan


Patient is a 41 year old BF with unknown prior psych history who presents to 

[facility] in a very disorganized fashion.  She was able to give me more 

details to several of my questions.  





AMS/Psychosis:  patient seems to display less symptoms of both at this time.  

We will wait for the delirium to subside further to restart the antipsychotic- 

to address her psychosis- likely tomorrow.  For her agitation a benzo can be 

used - being mindful that it can worsen the delirium though.  Further 

collateral will need to be obtained from the  to further understand 

additional treatment.  Patient will need placement to a psych inpt facility.








- Patient Problems


(1) Psychosis


Current Visit: Yes   Status: Acute   


Qualifiers: 


   Psychosis type: unspecified psychosis type   Schizoaffective disorder type: 

S   Schizophrenia type: S   Qualified Code(s): F29 - Unspecified psychosis not 

due to a substance or known physiological condition

## 2017-02-23 VITALS — SYSTOLIC BLOOD PRESSURE: 136 MMHG | DIASTOLIC BLOOD PRESSURE: 87 MMHG

## 2017-02-23 LAB
ANION GAP SERPL CALC-SCNC: 18 MMOL/L
BUN SERPL-MCNC: 7 MG/DL (ref 7–17)
BUN/CREAT SERPL: 10 %
CALCIUM SERPL-MCNC: 8.3 MG/DL (ref 8.4–10.2)
CHLORIDE SERPL-SCNC: 106.7 MMOL/L (ref 98–107)
CO2 SERPL-SCNC: 21 MMOL/L (ref 22–30)
GLUCOSE SERPL-MCNC: 105 MG/DL (ref 65–100)
POTASSIUM SERPL-SCNC: 4 MMOL/L (ref 3.6–5)
SODIUM SERPL-SCNC: 142 MMOL/L (ref 137–145)

## 2017-02-23 RX ADMIN — DOCUSATE SODIUM SCH: 100 CAPSULE, LIQUID FILLED ORAL at 11:22

## 2017-02-23 RX ADMIN — FAMOTIDINE SCH MG: 20 TABLET ORAL at 11:22

## 2017-02-23 RX ADMIN — ENOXAPARIN SODIUM SCH MG: 100 INJECTION SUBCUTANEOUS at 11:21

## 2017-02-23 RX ADMIN — SODIUM CHLORIDE AND POTASSIUM CHLORIDE SCH MLS/HR: 9; 2.98 INJECTION, SOLUTION INTRAVENOUS at 03:55

## 2017-02-23 NOTE — PROGRESS NOTE
Subjective





- Reason for Consult


Reason for consult: psych management





- Chief Complaint


Chief complaint: 


41 year old BF notes that she is getting better.  Patient has improved greatly 

since her admission.  She is now able to give more details regarding her 

overdose.  She still maintains that she was not thinking clearly when taking 

the meds- "that was ridiculous.  "I had no business taking the pills like that.

  Patient currently denies any SI/HI/AH/VH.  She notes that family stress of 

taking care of the bills may have contributed to her decompensation.  She now 

feels better.  Sleeping and eating fine with no physical aggression.  





Mental Status Exam





- Vital signs


 Last Vital Signs











Temp  98.6 F   02/23/17 07:54


 


Pulse  80   02/23/17 10:00


 


Resp  16   02/23/17 07:54


 


BP  129/97   02/23/17 07:54


 


Pulse Ox  100   02/23/17 10:00














- Exam


Orientation: time, place, person


Affect: normal


Mood: appropriate


Perceptions: none


Speech: normal rate and pattern


Concentration: distractible


Motor activity: normal


Level of consciousness: alert


Memory: Intact


Interaction: cooperative


Mini mental status exam(if necessary): 24-30





Assessment and Plan


Patient is a 41 year old BF with unknown prior psych history who presents to 

[facility] in a very disorganized fashion.  She was able to give me more 

details to several of my questions.  





AMS/Psychosis:  patient is doing better.  She has improved and now can 

transition to her other setting to further manage the meds and address her 

stresses.  No acute risk of self harm to self or others.  She is linear goal 

directed and denies any psychosis or depression to where she has any thoughts 

of self harm.


-rescind the 1013 and follow up with [facility] outpatient care.





- Patient Problems


(1) Psychosis


Current Visit: Yes   Status: Acute   


Qualifiers: 


   Psychosis type: unspecified psychosis type   Schizoaffective disorder type: 

S   Schizophrenia type: S   Qualified Code(s): F29 - Unspecified psychosis not 

due to a substance or known physiological condition

## 2017-02-23 NOTE — DISCHARGE SUMMARY
Providers





- Providers


Date of Admission: 


02/20/17 20:00





Date of discharge: 02/23/17


Attending physician: 


KELLIE SOLIS





 





02/21/17 09:31


Consult to Case Management [CONS] Routine 


   Services Needed at Discharge: Other


   Notified:: COPY GIVEN TO CM


   Additional Physician Instructions: inpt psych











Primary care physician: 


PRIMARY CARE MD








Hospitalization


Condition: Serious


Hospital course: 





Discharge Diagnosis:


Acute encephalopathy likely toxic


History of schizophrenia


History of multiple suicidal attempts in the past


Possible drug overdose


Hypokalemia, likley due to poor intake








Disposition: DC/TX PSY HOSP/PSY UNIT


Time spent for discharge: 35 minutes





Core Measure Documentation





- Palliative Care


Palliative Care/ Comfort Measures: Not Applicable





- Core Measures


Any of the following diagnoses?: none





Exam





- Physical Exam


Narrative exam: 





GENERAL: This is well-developed well-nourished AAF lying on bed appeared to be 

in no discomfort. 


HEENT: Normocephalic.  Atraumatic. External auditory canal and nares patent 

bilaterally.


NECK: Supple.  Trachea midline. No JVD, thyromagaly or lymphadenopathy.


CHEST/LUNGS: Clear to auscultated bilaterally.  There is no respiratory 

distress noted, breathing nonlabored. No wheezes crackles or rhonchi.


HEART/CARDIOVASCULAR: Regular in rate and rhythm.  PMI at the apex.  There is 

no gallop rub or murmur.


ABDOMEN: Abdomen is soft, nontender.  Patient has normal bowel sounds.  There 

is no abdominal distention. No organomagaly or rigidity.


SKIN: There is no rash,  no erythrema.  There is no diaphoresis. Warm and dry.


NEUROLOGY: no focal deficit


MUSCULOSKELETAL: No joint effusion or tenderness.  No muscle wasting. 


EXTRIMITY: No edema, cyanosis or clubbing.


PSYCH: cooperative.





- Constitutional


Vitals: 


 











Temp Pulse Resp BP Pulse Ox


 


 98.6 F   88   16   129/97   100 


 


 02/23/17 07:54  02/23/17 07:54  02/23/17 07:54  02/23/17 07:54  02/23/17 10:00














Plan


Activity: advance as tolerated


Weight Bearing Status: Weight Bear as Tolerated


Diet: low cholesterol, low salt


Follow up with: 


PRIMARY CARE,MD [Primary Care Provider] - 3-5 Days

## 2017-05-01 ENCOUNTER — HOSPITAL ENCOUNTER (EMERGENCY)
Dept: HOSPITAL 5 - ED | Age: 42
LOS: 2 days | Discharge: PSYCHIATRIC OR SUBSTANCE USE FACILITY | End: 2017-05-03
Payer: MEDICARE

## 2017-05-01 DIAGNOSIS — R40.1: Primary | ICD-10-CM

## 2017-05-01 DIAGNOSIS — F20.9: ICD-10-CM

## 2017-05-01 DIAGNOSIS — F29: ICD-10-CM

## 2017-05-01 DIAGNOSIS — F31.9: ICD-10-CM

## 2017-05-01 LAB
ALBUMIN SERPL-MCNC: 4.5 G/DL (ref 3.9–5)
ALBUMIN/GLOB SERPL: 1.7 %
ALP SERPL-CCNC: 69 UNITS/L (ref 35–129)
ALT SERPL-CCNC: 16 UNITS/L (ref 7–56)
ANION GAP SERPL CALC-SCNC: 27 MMOL/L
BASOPHILS NFR BLD AUTO: 0.3 % (ref 0–1.8)
BILIRUB SERPL-MCNC: 0.7 MG/DL (ref 0.1–1.2)
BUN SERPL-MCNC: 11 MG/DL (ref 7–17)
BUN/CREAT SERPL: 13.75 %
CALCIUM SERPL-MCNC: 10.2 MG/DL (ref 8.4–10.2)
CHLORIDE SERPL-SCNC: 95.7 MMOL/L (ref 98–107)
CO2 SERPL-SCNC: 20 MMOL/L (ref 22–30)
EOSINOPHIL NFR BLD AUTO: 0.1 % (ref 0–4.3)
GLUCOSE SERPL-MCNC: 133 MG/DL (ref 65–100)
HCT VFR BLD CALC: 41.4 % (ref 30.3–42.9)
HGB BLD-MCNC: 13.8 GM/DL (ref 10.1–14.3)
MCH RBC QN AUTO: 29 PG (ref 28–32)
MCHC RBC AUTO-ENTMCNC: 33 % (ref 30–34)
MCV RBC AUTO: 87 FL (ref 79–97)
PLATELET # BLD: 377 K/MM3 (ref 140–440)
POTASSIUM SERPL-SCNC: 3 MMOL/L (ref 3.6–5)
PROT SERPL-MCNC: 7.2 G/DL (ref 6.3–8.2)
RBC # BLD AUTO: 4.76 M/MM3 (ref 3.65–5.03)
SODIUM SERPL-SCNC: 140 MMOL/L (ref 137–145)
URINE DRUGS OF ABUSE NOTE: (no result)
WBC # BLD AUTO: 7.5 K/MM3 (ref 4.5–11)

## 2017-05-01 PROCEDURE — 84703 CHORIONIC GONADOTROPIN ASSAY: CPT

## 2017-05-01 PROCEDURE — 80320 DRUG SCREEN QUANTALCOHOLS: CPT

## 2017-05-01 PROCEDURE — 83735 ASSAY OF MAGNESIUM: CPT

## 2017-05-01 PROCEDURE — 84443 ASSAY THYROID STIM HORMONE: CPT

## 2017-05-01 PROCEDURE — 99285 EMERGENCY DEPT VISIT HI MDM: CPT

## 2017-05-01 PROCEDURE — 85025 COMPLETE CBC W/AUTO DIFF WBC: CPT

## 2017-05-01 PROCEDURE — 80307 DRUG TEST PRSMV CHEM ANLYZR: CPT

## 2017-05-01 PROCEDURE — 80053 COMPREHEN METABOLIC PANEL: CPT

## 2017-05-01 PROCEDURE — 36415 COLL VENOUS BLD VENIPUNCTURE: CPT

## 2017-05-01 PROCEDURE — G0480 DRUG TEST DEF 1-7 CLASSES: HCPCS

## 2017-05-01 RX ADMIN — BENZTROPINE MESYLATE SCH MG: 0.5 TABLET ORAL at 21:46

## 2017-05-01 NOTE — EMERGENCY DEPARTMENT REPORT
ED Psych HPI





- General


Chief Complaint: Psych


Stated Complaint: MH EVAL


Time Seen by Provider: 05/01/17 07:21


Source: patient, EMS


Mode of arrival: Stretcher





- History of Present Illness


MD Complaint: feels depressed, altered mental status


-: Gradual


Associated Psychiatric Symptoms: depression, auditory hallucinations


History of same: Yes


Quality: constant


Improves With: none


Worsens With: none


Context: not taking psychiatric


Associated Symptoms: confusion.  denies: headache, shortness of breath, nausea, 

vomiting, syncope


Treatments Prior to Arrival: none





- Related Data


 Home Medications











 Medication  Instructions  Recorded  Confirmed  Last Taken


 


Benztropine [Cogentin] 2 mg PO DAILY 02/20/17 02/20/17 Unknown


 


Hydrochlorothiazide [HCTZ] 25 mg PO QDAY 02/20/17 02/20/17 Unknown


 


LORazepam [Ativan] 1 mg PO BID 02/20/17 02/20/17 Unknown


 


Olanzapine [OLANZapine] 5 mg PO DAILY 02/20/17 02/20/17 Unknown


 


Perphenazine 4 mg PO DAILY 02/20/17 02/20/17 Unknown











 Allergies











Allergy/AdvReac Type Severity Reaction Status Date / Time


 


No Known Allergies Allergy   Verified 05/01/17 05:08














ED Review of Systems


ROS: 


Stated complaint: MH EVAL


Other details as noted in HPI





Comment: All other systems reviewed and negative





ED Past Medical Hx





- Past Medical History


Previous Medical History?: Yes


Hx Psychiatric Treatment: Yes (bipolar, schziophrenia)





- Surgical History


Past Surgical History?: No





- Social History


Smoking Status: Unknown if ever smoked





- Medications


Home Medications: 


 Home Medications











 Medication  Instructions  Recorded  Confirmed  Last Taken  Type


 


Benztropine [Cogentin] 2 mg PO DAILY 02/20/17 02/20/17 Unknown History


 


Hydrochlorothiazide [HCTZ] 25 mg PO QDAY 02/20/17 02/20/17 Unknown History


 


LORazepam [Ativan] 1 mg PO BID 02/20/17 02/20/17 Unknown History


 


Olanzapine [OLANZapine] 5 mg PO DAILY 02/20/17 02/20/17 Unknown History


 


Perphenazine 4 mg PO DAILY 02/20/17 02/20/17 Unknown History














ED Physical Exam





- General


Limitations: No Limitations


General appearance: alert, in no apparent distress





- Head


Head exam: Present: atraumatic, normocephalic





- Eye


Eye exam: Present: normal appearance





- ENT


ENT exam: Present: mucous membranes moist





- Neck


Neck exam: Present: normal inspection





- Respiratory


Respiratory exam: Present: normal lung sounds bilaterally.  Absent: respiratory 

distress





- Cardiovascular


Cardiovascular Exam: Present: regular rate, normal rhythm.  Absent: systolic 

murmur, diastolic murmur, rubs, gallop





- GI/Abdominal


GI/Abdominal exam: Present: soft, normal bowel sounds





- Extremities Exam


Extremities exam: Present: normal inspection





- Back Exam


Back exam: Present: normal inspection





- Neurological Exam


Neurological exam: Present: alert, oriented X3





- Psychiatric


Psychiatric exam: Present: agitated, anxious, manic





- Skin


Skin exam: Present: warm, dry, intact, normal color.  Absent: rash





ED Course


 Vital Signs











  05/01/17





  05:18


 


Respiratory 16





Rate 














ED Medical Decision Making





- Lab Data


Result diagrams: 


 05/01/17 09:55





 05/01/17 07:58


Critical care attestation.: 


If time is entered above; I have spent that time in minutes in the direct care 

of this critically ill patient, excluding procedure time.








ED Disposition


Clinical Impression: 


Altered mental status


Qualifiers:


 Altered mental status type: stupor Qualified Code(s): R40.1 - Stupor





Psychosis


Qualifiers:


 Psychosis type: unspecified psychosis type Qualified Code(s): F29 - 

Unspecified psychosis not due to a substance or known physiological condition





Disposition: DC/TX PSY HOSP/PSY UNIT


Is pt being admited?: No


Does the pt Need Aspirin: No


Condition: Good


Referrals: 


PRIMARY CARE,MD [Primary Care Provider] - 3-5 Days


Time of Disposition: 12:37

## 2017-05-02 RX ADMIN — BENZTROPINE MESYLATE SCH MG: 0.5 TABLET ORAL at 22:30

## 2017-05-02 NOTE — CONSULTATION
History of Present Illness





- Chief Complaint


Chief complaint: 


"I did the wrong thing"








Medications and Allergies


 Allergies











Allergy/AdvReac Type Severity Reaction Status Date / Time


 


No Known Allergies Allergy   Verified 05/01/17 05:08











 Home Medications











 Medication  Instructions  Recorded  Confirmed  Last Taken  Type


 


Benztropine [Cogentin] 2 mg PO DAILY 02/20/17 05/01/17 Unknown History


 


Hydrochlorothiazide [HCTZ] 25 mg PO QDAY 02/20/17 05/01/17 Unknown History











Active Meds: 


Active Medications





Benztropine Mesylate (Cogentin)  0.5 mg PO HS [facility]


   Last Admin: 05/01/17 21:46 Dose:  0.5 mg


Risperidone (Risperdal)  2 mg PO [facility]


   Last Admin: 05/01/17 21:46 Dose:  2 mg











Mental Status Exam





- Vital signs


 Last Vital Signs











Temp  98.1 F   05/02/17 08:55


 


Pulse  91 H  05/02/17 08:55


 


Resp  20   05/02/17 08:55


 


BP  142/76   05/02/17 08:55


 


Pulse Ox  98   05/02/17 08:55














Results


Result Diagrams: 


 05/01/17 09:55





 05/01/17 07:58


 Abnormal lab results











  05/01/17 Range/Units





  07:58 


 


Potassium  3.0 L  (3.6-5.0)  mmol/L


 


Chloride  95.7 L  ()  mmol/L


 


Carbon Dioxide  20 L  (22-30)  mmol/L


 


Glucose  133 H  ()  mg/dL








All other labs normal.

## 2017-05-02 NOTE — PROGRESS NOTE
Subjective





- Reason for Consult


Consult date: 05/02/17


Reason for consult: Psychiatry Follow-up





- Chief Complaint


Chief complaint: 


"I feel so much better"





42 y.o. AA female presenting to [facility] with depression and hallucinations. Today 

patient is calm, cooperative, with a linear thought process. She was able to 

tell me more about what happened before she was brought to [facility]. She stated 

that she was wandering in her community and became belligerent and the  was 

called. I asked if I can call her  to get more information and she 

declined. She feel like she need to get herself together (stable) before she 

contact her . Patient was more energetic today and stated, "Maybe I 

needed some sleep." Yesterday patient denies sleep issues. She denies SI/HI's, 

AVH's, poor appetite or sleep disturbance. She denies being sad or hopeless at 

this time. 





Mental Status Exam





- Vital signs


 Last Vital Signs











Temp  98.1 F   05/02/17 08:55


 


Pulse  91 H  05/02/17 08:55


 


Resp  20   05/02/17 08:55


 


BP  142/76   05/02/17 08:55


 


Pulse Ox  98   05/02/17 08:55














- Exam


Narrative exam: 


MSE:





 Appearance: calm, cooperative 


 Behavior: good eye contact 


 Speech: regular rate and tone 


 Mood: "I am not depressed" 


 Affect: euthymic


 Thought Process: linear 


 Thought Content: denies SI/HI's and AVH's 


 Cognition: A/O x3


 Insight: fair


 Judgment: fair








Assessment and Plan


Impression: 42 y.o. AA female presenting to [facility] with depression and 

hallucinations. Today patient is calm, cooperative, with a linear thought 

process. She was able to tell me more about what happened before she was 

brought to [facility]. She stated that she was wandering in her community and became 

belligerent and the  was called. I asked if I can call her  to get 

more information and she declined. She feel like she need to get herself 

together (stable) before she contact her . She denies SI/HI's and AVH's.





Recommendation/Plan: Continue 1013 today with possible placement to [facility]. Social 

Services involvement, patient is homeless. Obtain collateral information to 

help determine further treatment. Continue Risperdal 2 mg PO HS for psychosis 

and Cogentin 0.5 mg PO HS EPS prevention. Discussed possible metabolic side 

effects from Risperdal.

## 2017-05-03 VITALS — SYSTOLIC BLOOD PRESSURE: 152 MMHG | DIASTOLIC BLOOD PRESSURE: 106 MMHG

## 2017-05-03 NOTE — PROGRESS NOTE
Subjective





- Reason for Consult


Consult date: 05/03/17


Reason for consult: psychiatric follow up





- Chief Complaint


Chief complaint: 


"I feel so much better"





42 y.o. AA female presenting to [facility] with depression and hallucinations. Today 

patient is calm but peseverative about going home. Initially she presents as 

logical but later delusions were elicited. Patient states she is not Ellen Peacock and that her name is Xiomara. She was observed responding to internal 

stimuli (talking to herself).





Mental Status Exam





- Vital signs


 Last Vital Signs











Temp  98.8 F   05/03/17 09:36


 


Pulse  103 H  05/03/17 09:36


 


Resp  18   05/03/17 09:36


 


BP  111/66   05/03/17 09:36


 


Pulse Ox  98   05/03/17 09:36














- Exam


Narrative exam: 





She is aware that she is Ellen Peacock initially but later denied it.  

Perseverative thought process





No suicidal or homicidal ideation


Affect: anxious


Mood: congruent with affect


Thought content: delusions


Perceptions: other (responding to internal stimuli)


Speech: pressured


Concentration: distractible


Motor activity: restless


Level of consciousness: alert


Interaction: cooperative





Assessment and Plan








Impression: Psychosis, delusional thought content








Recommendation/Plan: Continue 1013 and transferred to inpatient psychiatric 

facility.   involvement, patient is homeless. Obtain collateral 

information to help determine further treatment. Continue Risperdal 2 mg PO HS 

for psychosis and Cogentin 0.5 mg PO HS EPS prevention. Discussed possible 

metabolic side effects from Risperdal.

## 2017-05-06 ENCOUNTER — HOSPITAL ENCOUNTER (EMERGENCY)
Dept: HOSPITAL 5 - ED | Age: 42
Discharge: HOME | End: 2017-05-06
Payer: MEDICARE

## 2017-05-06 VITALS — SYSTOLIC BLOOD PRESSURE: 138 MMHG | DIASTOLIC BLOOD PRESSURE: 62 MMHG

## 2017-05-06 DIAGNOSIS — F31.9: ICD-10-CM

## 2017-05-06 DIAGNOSIS — R56.9: Primary | ICD-10-CM

## 2017-05-06 DIAGNOSIS — F20.9: ICD-10-CM

## 2017-05-06 LAB
ALBUMIN SERPL-MCNC: 3.8 G/DL (ref 3.9–5)
ALBUMIN/GLOB SERPL: 1.7 %
ALP SERPL-CCNC: 56 UNITS/L (ref 35–129)
ALT SERPL-CCNC: 11 UNITS/L (ref 7–56)
ANION GAP SERPL CALC-SCNC: 22 MMOL/L
BILIRUB SERPL-MCNC: 0.4 MG/DL (ref 0.1–1.2)
BUN SERPL-MCNC: 10 MG/DL (ref 7–17)
BUN/CREAT SERPL: 14.28 %
CALCIUM SERPL-MCNC: 9.1 MG/DL (ref 8.4–10.2)
CARBAMAZEPINE SERPL-MCNC: 0.5 UG/ML (ref 4–12)
CHLORIDE SERPL-SCNC: 104.4 MMOL/L (ref 98–107)
CO2 SERPL-SCNC: 20 MMOL/L (ref 22–30)
GLUCOSE SERPL-MCNC: 88 MG/DL (ref 65–100)
HCT VFR BLD CALC: 43.7 % (ref 30.3–42.9)
HGB BLD-MCNC: 13.9 GM/DL (ref 10.1–14.3)
MCH RBC QN AUTO: 29 PG (ref 28–32)
MCHC RBC AUTO-ENTMCNC: 32 % (ref 30–34)
MCV RBC AUTO: 90 FL (ref 79–97)
PLATELET # BLD: 338 K/MM3 (ref 140–440)
POTASSIUM SERPL-SCNC: 4.7 MMOL/L (ref 3.6–5)
PROT SERPL-MCNC: 6.1 G/DL (ref 6.3–8.2)
RBC # BLD AUTO: 4.83 M/MM3 (ref 3.65–5.03)
SODIUM SERPL-SCNC: 142 MMOL/L (ref 137–145)
URINE DRUGS OF ABUSE NOTE: (no result)
WBC # BLD AUTO: 7.1 K/MM3 (ref 4.5–11)

## 2017-05-06 PROCEDURE — 80185 ASSAY OF PHENYTOIN TOTAL: CPT

## 2017-05-06 PROCEDURE — 96360 HYDRATION IV INFUSION INIT: CPT

## 2017-05-06 PROCEDURE — 70450 CT HEAD/BRAIN W/O DYE: CPT

## 2017-05-06 PROCEDURE — 80156 ASSAY CARBAMAZEPINE TOTAL: CPT

## 2017-05-06 PROCEDURE — 85025 COMPLETE CBC W/AUTO DIFF WBC: CPT

## 2017-05-06 PROCEDURE — 80307 DRUG TEST PRSMV CHEM ANLYZR: CPT

## 2017-05-06 PROCEDURE — 80053 COMPREHEN METABOLIC PANEL: CPT

## 2017-05-06 PROCEDURE — 80164 ASSAY DIPROPYLACETIC ACD TOT: CPT

## 2017-05-06 PROCEDURE — 36415 COLL VENOUS BLD VENIPUNCTURE: CPT

## 2017-05-06 PROCEDURE — 99284 EMERGENCY DEPT VISIT MOD MDM: CPT

## 2017-05-06 NOTE — EMERGENCY DEPARTMENT REPORT
ED Seizure HPI





- General


Chief Complaint: Seizure


Stated Complaint: SEIZURE


Time Seen by Provider: 05/06/17 11:45


Source: EMS, old records reviewed


Mode of arrival: Stretcher


Limitations: Altered Mental Status, Other





- History of Present Illness


MD Complaint: seizure, loss of consciousness


-: Sudden


Description of Episode: loss of consciousness, tonic-clonic movement


Duration of Episode: 3


Witnessed:: Yes


Trauma: No


Seizure History: known seizure disorder


Place: home


Possible Precipitating Event: none


Associated Symptoms: denies: chest pain, cough, diaphoresis, malaise, rash, 

syncope, weakness





- Related Data


 Home Medications











 Medication  Instructions  Recorded  Confirmed  Last Taken


 


Benztropine [Cogentin] 2 mg PO DAILY 02/20/17 05/01/17 Unknown


 


Hydrochlorothiazide [HCTZ] 25 mg PO QDAY 02/20/17 05/01/17 Unknown








 Previous Rx's











 Medication  Instructions  Recorded  Last Taken  Type


 


Phenytoin (25 mg/ml) [Dilantin] 100 mg PO Q8HR #90 tab 05/06/17 Unknown Rx











 Allergies











Allergy/AdvReac Type Severity Reaction Status Date / Time


 


No Known Allergies Allergy   Verified 05/01/17 05:08














ED Review of Systems


ROS: 


Stated complaint: SEIZURE


Other details as noted in HPI





Constitutional: denies: chills, fever


Eyes: denies: eye pain, eye discharge, vision change


ENT: denies: ear pain, throat pain


Respiratory: denies: cough, shortness of breath, wheezing


Cardiovascular: denies: chest pain, palpitations


Endocrine: no symptoms reported


Gastrointestinal: denies: abdominal pain, nausea, diarrhea


Genitourinary: denies: urgency, dysuria, discharge


Musculoskeletal: denies: back pain, joint swelling, arthralgia


Skin: denies: rash, lesions


Neurological: denies: headache, weakness, paresthesias


Psychiatric: denies: anxiety, depression


Hematological/Lymphatic: denies: easy bleeding, easy bruising





ED Past Medical Hx





- Past Medical History


Hx Psychiatric Treatment: Yes (bipolar, schziophrenia)





- Social History


Smoking Status: Unknown if ever smoked





- Medications


Home Medications: 


 Home Medications











 Medication  Instructions  Recorded  Confirmed  Last Taken  Type


 


Benztropine [Cogentin] 2 mg PO DAILY 02/20/17 05/01/17 Unknown History


 


Hydrochlorothiazide [HCTZ] 25 mg PO QDAY 02/20/17 05/01/17 Unknown History


 


Phenytoin (25 mg/ml) [Dilantin] 100 mg PO Q8HR #90 tab 05/06/17  Unknown Rx














ED Physical Exam





- General


Limitations: Other


General appearance: alert, in no apparent distress





- Head


Head exam: Present: atraumatic, normocephalic





- Eye


Eye exam: Present: normal appearance





- ENT


ENT exam: Present: mucous membranes moist





- Neck


Neck exam: Present: normal inspection





- Respiratory


Respiratory exam: Present: normal lung sounds bilaterally.  Absent: respiratory 

distress





- Cardiovascular


Cardiovascular Exam: Present: regular rate, normal rhythm.  Absent: systolic 

murmur, diastolic murmur, rubs, gallop





- GI/Abdominal


GI/Abdominal exam: Present: soft, normal bowel sounds





- Extremities Exam


Extremities exam: Present: normal inspection





- Back Exam


Back exam: Present: normal inspection





- Neurological Exam


Neurological exam: Present: alert, oriented X3, CN II-XII intact, normal gait





- Psychiatric


Psychiatric exam: Present: normal affect, normal mood





- Skin


Skin exam: Present: warm, dry, intact, normal color.  Absent: rash





ED Course


 Vital Signs











  05/06/17





  11:24


 


Temperature 97.4 F L


 


Pulse Rate 74


 


Respiratory 20





Rate 


 


Blood Pressure 154/97


 


O2 Sat by Pulse 100





Oximetry 














ED Medical Decision Making





- Lab Data


Result diagrams: 


 05/06/17 12:26





 05/06/17 12:26





- Radiology Data


Radiology results: report reviewed, image reviewed





- Medical Decision Making





Patient well-known to me seeing her this past week for a psychotic event she 

was transferred to psychiatry facility where she's been getting help in taking 

medications.  There was a possibility that she had a seizure today even though 

Dr. Byers is a [place] is agitated with their and there is nothing there is 

nothing there is evidence of of a seizure so the plan was to transfer the 

patient here in the ER I elevated the patient overnight as she is moving all 4 

extremities is able to talk her nothing by mouth I did some blood tests on her 

Dilantin levels the lip is minimally low with similar refill.  That CAT scan to 

suggest taking magnesium to the plan will be to discharge to [facility] so she can 

continue


Critical care attestation.: 


If time is entered above; I have spent that time in minutes in the direct care 

of this critically ill patient, excluding procedure time.








ED Disposition


Clinical Impression: 


 Seizure





Disposition: DISCHARGED TO HOME OR SELFCARE


Is pt being admited?: No


Does the pt Need Aspirin: No


Condition: Good


Prescriptions: 


Phenytoin (25 mg/ml) [Dilantin] 100 mg PO Q8HR #90 tab


Referrals: 


PRIMARY CARE,MD [Primary Care Provider] - 3-5 Days


Time of Disposition: 16:28

## 2017-05-06 NOTE — CAT SCAN REPORT
CT HEAD WITHOUT CONTRAST: 05/06/17 



CLINICAL: Seizure.



TECHNIQUE: 2.5-mm noncontrast scans.



COMPARISON:02/20/17



FINDINGS: The ventricles and sulci are normal for age. No abnormal 

density.  No mass or mass effect.  No hemorrhage, edema or extra-axial 

collection.  The sinuses are clear.  Normal orbits and soft tissues.  

The calvarium and skull base are intact.



IMPRESSION: Normal study.

## 2018-05-30 ENCOUNTER — HOSPITAL ENCOUNTER (INPATIENT)
Dept: HOSPITAL 5 - ED | Age: 43
LOS: 6 days | Discharge: HOME | DRG: 871 | End: 2018-06-05
Attending: INTERNAL MEDICINE | Admitting: INTERNAL MEDICINE
Payer: MEDICARE
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
## 2018-05-30 DIAGNOSIS — N76.0: ICD-10-CM

## 2018-05-30 DIAGNOSIS — A41.9: Primary | ICD-10-CM

## 2018-05-30 DIAGNOSIS — N39.0: ICD-10-CM

## 2018-05-30 DIAGNOSIS — F31.9: ICD-10-CM

## 2018-05-30 DIAGNOSIS — T14.91XA: ICD-10-CM

## 2018-05-30 DIAGNOSIS — F29: ICD-10-CM

## 2018-05-30 DIAGNOSIS — Z23: ICD-10-CM

## 2018-05-30 DIAGNOSIS — E66.01: ICD-10-CM

## 2018-05-30 DIAGNOSIS — M62.82: ICD-10-CM

## 2018-05-30 DIAGNOSIS — B96.20: ICD-10-CM

## 2018-05-30 DIAGNOSIS — Z90.710: ICD-10-CM
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
## 2018-05-30 DIAGNOSIS — I10: ICD-10-CM
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
## 2018-05-30 DIAGNOSIS — I16.1: ICD-10-CM

## 2018-05-30 DIAGNOSIS — G93.41: ICD-10-CM

## 2018-05-30 DIAGNOSIS — Z71.3: ICD-10-CM

## 2018-05-30 DIAGNOSIS — F20.9: ICD-10-CM

## 2018-05-30 LAB
ALBUMIN SERPL-MCNC: 4.2 G/DL (ref 3.9–5)
ALT SERPL-CCNC: 27 UNITS/L (ref 7–56)
BACTERIA #/AREA URNS HPF: (no result) /HPF
BENZODIAZEPINES SCREEN,URINE: (no result)
BILIRUB UR QL STRIP: (no result)
BLOOD UR QL VISUAL: (no result)
BUN SERPL-MCNC: 8 MG/DL (ref 7–17)
BUN/CREAT SERPL: 13 %
CALCIUM SERPL-MCNC: 9 MG/DL (ref 8.4–10.2)
HCT VFR BLD CALC: 42.2 % (ref 30.3–42.9)
HEMOLYSIS INDEX: 32
HGB BLD-MCNC: 13.6 GM/DL (ref 10.1–14.3)
MCH RBC QN AUTO: 28 PG (ref 28–32)
MCHC RBC AUTO-ENTMCNC: 32 % (ref 30–34)
MCV RBC AUTO: 86 FL (ref 79–97)
METHADONE SCREEN,URINE: (no result)
MUCOUS THREADS #/AREA URNS HPF: (no result) /HPF
OPIATE SCREEN,URINE: (no result)
PH UR STRIP: 5 [PH] (ref 5–7)
PLATELET # BLD: 457 K/MM3 (ref 140–440)
RBC # BLD AUTO: 4.9 M/MM3 (ref 3.65–5.03)
RBC #/AREA URNS HPF: 5 /HPF (ref 0–6)
UROBILINOGEN UR-MCNC: 2 MG/DL (ref ?–2)
WBC #/AREA URNS HPF: 2 /HPF (ref 0–6)

## 2018-05-30 PROCEDURE — 80307 DRUG TEST PRSMV CHEM ANLYZR: CPT

## 2018-05-30 PROCEDURE — 80053 COMPREHEN METABOLIC PANEL: CPT

## 2018-05-30 PROCEDURE — 82553 CREATINE MB FRACTION: CPT

## 2018-05-30 PROCEDURE — G0480 DRUG TEST DEF 1-7 CLASSES: HCPCS

## 2018-05-30 PROCEDURE — 82550 ASSAY OF CK (CPK): CPT

## 2018-05-30 PROCEDURE — 82140 ASSAY OF AMMONIA: CPT

## 2018-05-30 PROCEDURE — 90686 IIV4 VACC NO PRSV 0.5 ML IM: CPT

## 2018-05-30 PROCEDURE — 80320 DRUG SCREEN QUANTALCOHOLS: CPT

## 2018-05-30 PROCEDURE — 96375 TX/PRO/DX INJ NEW DRUG ADDON: CPT

## 2018-05-30 PROCEDURE — 81001 URINALYSIS AUTO W/SCOPE: CPT

## 2018-05-30 PROCEDURE — 85027 COMPLETE CBC AUTOMATED: CPT

## 2018-05-30 PROCEDURE — 87086 URINE CULTURE/COLONY COUNT: CPT

## 2018-05-30 PROCEDURE — 87076 CULTURE ANAEROBE IDENT EACH: CPT

## 2018-05-30 PROCEDURE — 83735 ASSAY OF MAGNESIUM: CPT

## 2018-05-30 PROCEDURE — 80185 ASSAY OF PHENYTOIN TOTAL: CPT

## 2018-05-30 PROCEDURE — 96372 THER/PROPH/DIAG INJ SC/IM: CPT

## 2018-05-30 PROCEDURE — 87040 BLOOD CULTURE FOR BACTERIA: CPT

## 2018-05-30 PROCEDURE — 96365 THER/PROPH/DIAG IV INF INIT: CPT

## 2018-05-30 PROCEDURE — 84443 ASSAY THYROID STIM HORMONE: CPT

## 2018-05-30 PROCEDURE — 80048 BASIC METABOLIC PNL TOTAL CA: CPT

## 2018-05-30 PROCEDURE — 36415 COLL VENOUS BLD VENIPUNCTURE: CPT

## 2018-05-30 PROCEDURE — 76770 US EXAM ABDO BACK WALL COMP: CPT

## 2018-05-30 PROCEDURE — 70450 CT HEAD/BRAIN W/O DYE: CPT

## 2018-05-30 PROCEDURE — 87186 SC STD MICRODIL/AGAR DIL: CPT

## 2018-05-30 PROCEDURE — 71045 X-RAY EXAM CHEST 1 VIEW: CPT

## 2018-05-30 RX ADMIN — SODIUM CHLORIDE SCH MLS/HR: 0.45 INJECTION, SOLUTION INTRAVENOUS at 22:30

## 2018-05-30 RX ADMIN — POTASSIUM CHLORIDE SCH MLS/HR: 10 INJECTION, SOLUTION INTRAVENOUS at 17:06

## 2018-05-30 RX ADMIN — PHENYTOIN SCH MG: 100 SUSPENSION ORAL at 15:57

## 2018-05-30 RX ADMIN — CEFTRIAXONE SODIUM SCH MLS/MIN: 10 INJECTION, POWDER, FOR SOLUTION INTRAVENOUS at 13:17

## 2018-05-30 RX ADMIN — PHENYTOIN SCH MG: 100 SUSPENSION ORAL at 22:30

## 2018-05-30 RX ADMIN — POTASSIUM CHLORIDE SCH MLS/HR: 10 INJECTION, SOLUTION INTRAVENOUS at 11:47

## 2018-05-30 RX ADMIN — TRAZODONE HYDROCHLORIDE SCH MG: 100 TABLET ORAL at 22:30

## 2018-05-30 RX ADMIN — Medication SCH ML: at 22:30

## 2018-05-30 RX ADMIN — POTASSIUM CHLORIDE SCH MLS/HR: 10 INJECTION, SOLUTION INTRAVENOUS at 15:12

## 2018-05-30 RX ADMIN — HALOPERIDOL LACTATE PRN MG: 5 INJECTION, SOLUTION INTRAMUSCULAR at 09:11

## 2018-05-30 RX ADMIN — POTASSIUM CHLORIDE SCH MLS/HR: 10 INJECTION, SOLUTION INTRAVENOUS at 13:16

## 2018-05-30 NOTE — EMERGENCY DEPARTMENT REPORT
ED General Adult HPI





- General


Chief complaint: Psych


Stated complaint: MH EVAL


Time Seen by Provider: 05/30/18 03:26


Source: police, EMS


Mode of arrival: Stretcher


Limitations: Altered Mental Status, Other (Patient is refusing to talk.)





- History of Present Illness


-: unknown


Improves with: none


Worsens with: none


Associated Symptoms: other


Treatments Prior to Arrival: none





- Related Data


 Home Medications











 Medication  Instructions  Recorded  Confirmed  Last Taken


 


Benztropine [Cogentin] 2 mg PO DAILY 02/20/17 05/30/18 Unknown


 


Hydrochlorothiazide [HCTZ] 25 mg PO QDAY 02/20/17 05/30/18 Unknown


 


FLUoxetine HCL [Prozac] 20 mg PO DAILY 05/30/18 05/30/18 Unknown


 


Olanzapine [OLANZapine] 30 mg PO HS 05/30/18 05/30/18 Unknown


 


traZODone [Desyrel] 100 mg PO QHS 05/30/18 05/30/18 Unknown








 Previous Rx's











 Medication  Instructions  Recorded  Last Taken  Type


 


Phenytoin (25 mg/ml) [Dilantin] 100 mg PO Q8HR #90 tab 05/06/17 Unknown Rx











 Allergies











Allergy/AdvReac Type Severity Reaction Status Date / Time


 


No Known Allergies Allergy   Verified 05/01/17 05:08














ED Review of Systems


ROS: 


Stated complaint: MH EVAL


Other details as noted in HPI





Comment: Unobtainable due to pts medical conditions (altered mental status)





ED Past Medical Hx





- Past Medical History


Hx Hypertension: Yes


Hx Psychiatric Treatment: Yes (bipolar, schziophrenia)





- Social History


Smoking Status: Unknown if ever smoked


Substance Use Type: None





- Medications


Home Medications: 


 Home Medications











 Medication  Instructions  Recorded  Confirmed  Last Taken  Type


 


Benztropine [Cogentin] 2 mg PO DAILY 02/20/17 05/30/18 Unknown History


 


Hydrochlorothiazide [HCTZ] 25 mg PO QDAY 02/20/17 05/30/18 Unknown History


 


Phenytoin (25 mg/ml) [Dilantin] 100 mg PO Q8HR #90 tab 05/06/17 05/30/18 

Unknown Rx


 


FLUoxetine HCL [Prozac] 20 mg PO DAILY 05/30/18 05/30/18 Unknown History


 


Olanzapine [OLANZapine] 30 mg PO HS 05/30/18 05/30/18 Unknown History


 


traZODone [Desyrel] 100 mg PO QHS 05/30/18 05/30/18 Unknown History














ED Physical Exam





- General


Limitations: No Limitations


General appearance: alert, in no apparent distress





- Head


Head exam: Present: atraumatic, normocephalic, normal inspection





- Eye


Eye exam: Present: normal appearance





- ENT


ENT exam: Present: normal exam





- Neck


Neck exam: Present: normal inspection





- Respiratory


Respiratory exam: Present: normal lung sounds bilaterally.  Absent: wheezes





- Cardiovascular


Cardiovascular Exam: Present: tachycardia, normal heart sounds





- GI/Abdominal


GI/Abdominal exam: Present: soft, normal bowel sounds.  Absent: tenderness, 

guarding, rebound





- Extremities Exam


Extremities exam: Present: normal inspection, normal capillary refill





- Back Exam


Back exam: Present: normal inspection





- Neurological Exam


Neurological exam: Present: alert (GCS = 10)





- Psychiatric


Psychiatric exam: Present: flat affect





- Skin


Skin exam: Present: warm, dry, intact, normal color.  Absent: rash





ED Course


 Vital Signs











  05/30/18 05/30/18 05/30/18





  03:31 03:59 04:30


 


Temperature 98.7 F  


 


Pulse Rate 119 H 110 H 119 H


 


Respiratory   22





Rate   


 


Blood Pressure 210/125 210/125 


 


Blood Pressure   186/100





[Right]   


 


O2 Sat by Pulse 98  100





Oximetry   














  05/30/18 05/30/18 05/30/18





  06:00 07:39 08:10


 


Temperature   


 


Pulse Rate  122 H 127 H


 


Respiratory  16 19





Rate   


 


Blood Pressure   


 


Blood Pressure  194/119 





[Right]   


 


O2 Sat by Pulse 100 97 





Oximetry   














  05/30/18 05/30/18 05/30/18





  08:21 08:30 08:39


 


Temperature   99.6 F


 


Pulse Rate 120 H 124 H 


 


Respiratory 29 H 32 H 





Rate   


 


Blood Pressure  192/116 


 


Blood Pressure   





[Right]   


 


O2 Sat by Pulse 96 97 





Oximetry   














  05/30/18 05/30/18 05/30/18





  08:41 08:46 08:51


 


Temperature  99.6 F 


 


Pulse Rate 115 H 114 H 115 H


 


Respiratory 26 H 24 25 H





Rate   


 


Blood Pressure 192/116  161/101


 


Blood Pressure  192/116 





[Right]   


 


O2 Sat by Pulse 97 97 96





Oximetry   














  05/30/18 05/30/18 05/30/18





  09:00 09:11 09:21


 


Temperature   


 


Pulse Rate 138 H 104 H 100 H


 


Respiratory 19 17 15





Rate   


 


Blood Pressure 170/110 170/110 112/59


 


Blood Pressure   





[Right]   


 


O2 Sat by Pulse 95 97 97





Oximetry   














  05/30/18 05/30/18 05/30/18





  09:30 09:54 10:01


 


Temperature   


 


Pulse Rate 95 H  104 H


 


Respiratory  12 16





Rate   


 


Blood Pressure 112/59 112/59 112/59


 


Blood Pressure   





[Right]   


 


O2 Sat by Pulse   98





Oximetry   














  05/30/18 05/30/18 05/30/18





  10:11 10:21 10:31


 


Temperature   


 


Pulse Rate 102 H 97 H 95 H


 


Respiratory 14 15 14





Rate   


 


Blood Pressure 112/59 112/59 112/59


 


Blood Pressure   





[Right]   


 


O2 Sat by Pulse 98 98 99





Oximetry   














  05/30/18 05/30/18 05/30/18





  10:41 10:51 11:01


 


Temperature   


 


Pulse Rate 89 95 H 91 H


 


Respiratory 13 16 14





Rate   


 


Blood Pressure 112/70 112/70 112/70


 


Blood Pressure   





[Right]   


 


O2 Sat by Pulse 97 99 96





Oximetry   














  05/30/18 05/30/18 05/30/18





  11:11 11:21 11:31


 


Temperature   


 


Pulse Rate 89 89 127 H


 


Respiratory 18 14 11 L





Rate   


 


Blood Pressure 112/70 112/70 112/70


 


Blood Pressure   





[Right]   


 


O2 Sat by Pulse 99 100 98





Oximetry   














  05/30/18 05/30/18 05/30/18





  11:41 11:51 12:01


 


Temperature   


 


Pulse Rate 98 H 99 H 94 H


 


Respiratory 24 16 15





Rate   


 


Blood Pressure 112/70 112/70 112/70


 


Blood Pressure   





[Right]   


 


O2 Sat by Pulse 98 99 98





Oximetry   














  05/30/18 05/30/18 05/30/18





  12:11 12:21 12:31


 


Temperature   


 


Pulse Rate 88  


 


Respiratory 15 18 





Rate   


 


Blood Pressure 112/70 112/70 112/70


 


Blood Pressure   





[Right]   


 


O2 Sat by Pulse 97 97 98





Oximetry   














  05/30/18 05/30/18 05/30/18





  12:41 12:51 13:00


 


Temperature   98.9 F


 


Pulse Rate   97 H


 


Respiratory   20





Rate   


 


Blood Pressure 112/70 112/70 


 


Blood Pressure   127/83





[Right]   


 


O2 Sat by Pulse 98 98 100





Oximetry   














  05/30/18 05/30/18 05/30/18





  13:01 13:11 13:22


 


Temperature   


 


Pulse Rate 99 H 102 H 93 H


 


Respiratory 17 18 17





Rate   


 


Blood Pressure 112/70 112/70 


 


Blood Pressure   





[Right]   


 


O2 Sat by Pulse 100 100 99





Oximetry   














  05/30/18 05/30/18 05/30/18





  13:30 13:40 13:50


 


Temperature   


 


Pulse Rate 88 91 H 90


 


Respiratory 19 15 21





Rate   


 


Blood Pressure   


 


Blood Pressure   





[Right]   


 


O2 Sat by Pulse 99 99 98





Oximetry   














  05/30/18 05/30/18 05/30/18





  14:00 14:10 14:20


 


Temperature   


 


Pulse Rate 107 H 92 H 96 H


 


Respiratory 14 18 16





Rate   


 


Blood Pressure 164/134 182/109 127/83


 


Blood Pressure   





[Right]   


 


O2 Sat by Pulse 100 98 99





Oximetry   














  05/30/18 05/30/18 05/30/18





  14:30 14:41 14:51


 


Temperature   


 


Pulse Rate 124 H 122 H 120 H


 


Respiratory 28 H 26 H 24





Rate   


 


Blood Pressure 172/114  172/114


 


Blood Pressure   





[Right]   


 


O2 Sat by Pulse 100 99 98





Oximetry   














- Reevaluation(s)


Reevaluation #1: 





05/30/18 06:13


Patient care was transitioned to Dr. Melissa at shift change pending labs, and 

CT head without contrast prior to disposition.





ED Medical Decision Making





- Lab Data


Result diagrams: 


 05/30/18 08:15





 05/30/18 08:15





- Radiology Data


Radiology results: report reviewed, image reviewed





- Medical Decision Making





Hypertensive Eemergency.


Acute Psycosis.


hypertensive Encephalopathy.


Critical care attestation.: 


If time is entered above; I have spent that time in minutes in the direct care 

of this critically ill patient, excluding procedure time.








ED Disposition


Clinical Impression: 


 Hypertensive emergency, Hypertensive encephalopathy, Acute psychosis





Altered mental status


Qualifiers:


 Altered mental status type: unspecified Qualified Code(s): R41.82 - Altered 

mental status, unspecified





Disposition: [id number] OP ADMIT IP TO THIS HOSP


Is pt being admited?: Yes


Does the pt Need Aspirin: No


Condition: Stable

## 2018-05-30 NOTE — XRAY REPORT
FINAL REPORT



PROCEDURE:  XRAY CHEST SINGLE VIEW



TECHNIQUE:  Chest radiograph anteroposterior view. CPT 63450







HISTORY:  ams 



COMPARISON:  No prior studies are available for comparison.



FINDINGS:  

Heart: Normal.



Mediastinum/Vessels: Normal.



Lungs/Pleural space: Lungs are expanded. There are no

infiltrates, effusions or pneumothoraces per.



Bony thorax: No acute osseous abnormality.



Life support devices: None.



IMPRESSION:  

No acute cardiopulmonary abnormality.

## 2018-05-30 NOTE — EVENT NOTE
Date: 05/30/18














The patient was signed out to me by the overnight physician, Dr. Mena.  The 

patient was very agitated, belligerent and combative with staff, and required 

multiple interventions for diagnostic workup, including multiple sedating 

medications and soft restraints, as she is actively psychotic, does not exhibit 

decision-making capacity, and did not respond to verbal the escalation 

techniques or show of force.  Patient had floridly abnormal vital signs, 

including tachycardia, low-grade temperature of 99.6 rectally, and markedly 

elevated blood pressure.  Her compartments were soft, she did not have 

hyperreflexia, and it was no clonus.  There is no neck pain or neck stiffness, 

and noncontrast CT scan of the brain was negative.





Laboratory studies indicated leukocytosis, in conjunction with a urinalysis 

that may be suggestive of urinary tract infection.  Given the patient's 

psychosis and sedation, she cannot offer history to attest or refute this.





Laboratory studies also indicated 


 hypokalemia, and myositis with a creatinine kinase of almost 2300.  The 

patient ruled in for systemic inflammatory response syndrome criteria.


Please note that there was delay in administration of antibiotics past the three

-hour gerardo because took a very long time for the patient's diagnostics to be 

completed given her underlying psychosis, and need for escalating chemical 

sedation.





Her blood pressures improved with hydralazine, she'll be treated along the 

sepsis pathway with IV fluids, lactic acid, and targeted antibiotic therapy.  

She is not medically stable for psychiatric placement at this time, psychiatry 

team should follow in consultation with the inpatient team who is going to 

admit the patient for hypertensive emergency, myositis, and systemic 

inflammatory response syndrome. Dr Dillon accepts the patient to the medical 

service











 Vital Signs











  05/30/18 05/30/18 05/30/18





  03:31 03:59 04:30


 


Temperature 98.7 F  


 


Pulse Rate 119 H 110 H 119 H


 


Respiratory   22





Rate   


 


Blood Pressure 210/125 210/125 


 


Blood Pressure   186/100





[Right]   


 


O2 Sat by Pulse 98  100





Oximetry   














  05/30/18 05/30/18 05/30/18





  06:00 07:39 08:39


 


Temperature   99.6 F


 


Pulse Rate  122 H 


 


Respiratory  16 





Rate   


 


Blood Pressure   


 


Blood Pressure  194/119 





[Right]   


 


O2 Sat by Pulse 100 97 





Oximetry   














  05/30/18 05/30/18





  08:46 09:30


 


Temperature 99.6 F 


 


Pulse Rate 114 H 95 H


 


Respiratory 24 





Rate  


 


Blood Pressure  112/59


 


Blood Pressure 192/116 





[Right]  


 


O2 Sat by Pulse 97 





Oximetry  








 Lab Results











  05/30/18 05/30/18 05/30/18 Range/Units





  05:27 05:27 05:27 


 


WBC     (4.5-11.0)  K/mm3


 


RBC     (3.65-5.03)  M/mm3


 


Hgb     (10.1-14.3)  gm/dl


 


Hct     (30.3-42.9)  %


 


MCV     (79-97)  fl


 


MCH     (28-32)  pg


 


MCHC     (30-34)  %


 


RDW     (13.2-15.2)  %


 


Plt Count     (140-440)  K/mm3


 


Sodium     (137-145)  mmol/L


 


Potassium     (3.6-5.0)  mmol/L


 


Chloride     ()  mmol/L


 


Carbon Dioxide     (22-30)  mmol/L


 


Anion Gap     mmol/L


 


BUN     (7-17)  mg/dL


 


Creatinine     (0.7-1.2)  mg/dL


 


Estimated GFR     ml/min


 


BUN/Creatinine Ratio     %


 


Glucose     ()  mg/dL


 


Lactic Acid     (0.7-2.0)  mmol/L


 


Calcium     (8.4-10.2)  mg/dL


 


Magnesium     (1.7-2.3)  mg/dL


 


Total Bilirubin     (0.1-1.2)  mg/dL


 


AST     (5-40)  units/L


 


ALT     (7-56)  units/L


 


Alkaline Phosphatase     ()  units/L


 


Total Creatine Kinase     ()  units/L


 


Total Protein     (6.3-8.2)  g/dL


 


Albumin     (3.9-5)  g/dL


 


Albumin/Globulin Ratio     %


 


TSH  0.488    (0.270-4.200)  mlU/mL


 


Urine Color     (Yellow)  


 


Urine Turbidity     (Clear)  


 


Urine pH     (5.0-7.0)  


 


Ur Specific Gravity     (1.003-1.030)  


 


Urine Protein     (Negative)  mg/dL


 


Urine Glucose (UA)     (Negative)  mg/dL


 


Urine Ketones     (Negative)  mg/dL


 


Urine Blood     (Negative)  


 


Urine Nitrite     (Negative)  


 


Urine Bilirubin     (Negative)  


 


Urine Urobilinogen     (<2.0)  mg/dL


 


Ur Leukocyte Esterase     (Negative)  


 


Urine WBC (Auto)     (0.0-6.0)  /HPF


 


Urine RBC (Auto)     (0.0-6.0)  /HPF


 


U Epithel Cells (Auto)     (0-13.0)  /HPF


 


Urine Bacteria (Auto)     (Negative)  /HPF


 


Urine Mucus     /HPF


 


Salicylates   < 0.3 L   (2.8-20.0)  mg/dL


 


Urine Opiates Screen     


 


Urine Methadone Screen     


 


Acetaminophen    < 5.0 L  (10.0-30.0)  ug/mL


 


Ur Barbiturates Screen     


 


Ur Phencyclidine Scrn     


 


Ur Amphetamines Screen     


 


U Benzodiazepines Scrn     


 


Urine Cocaine Screen     


 


U Marijuana (THC) Screen     


 


Drugs of Abuse Note     


 


Plasma/Serum Alcohol     (0-0.07)  %














  05/30/18 05/30/18 05/30/18 Range/Units





  05:27 08:15 08:15 


 


WBC   14.5 H   (4.5-11.0)  K/mm3


 


RBC   4.90   (3.65-5.03)  M/mm3


 


Hgb   13.6   (10.1-14.3)  gm/dl


 


Hct   42.2   (30.3-42.9)  %


 


MCV   86   (79-97)  fl


 


MCH   28   (28-32)  pg


 


MCHC   32   (30-34)  %


 


RDW   13.1 L   (13.2-15.2)  %


 


Plt Count   457 H   (140-440)  K/mm3


 


Sodium    141  (137-145)  mmol/L


 


Potassium    3.1 L  (3.6-5.0)  mmol/L


 


Chloride    100.1  ()  mmol/L


 


Carbon Dioxide    24  (22-30)  mmol/L


 


Anion Gap    20  mmol/L


 


BUN    8  (7-17)  mg/dL


 


Creatinine    0.6 L  (0.7-1.2)  mg/dL


 


Estimated GFR    > 60  ml/min


 


BUN/Creatinine Ratio    13  %


 


Glucose    126 H  ()  mg/dL


 


Lactic Acid     (0.7-2.0)  mmol/L


 


Calcium    9.0  (8.4-10.2)  mg/dL


 


Magnesium     (1.7-2.3)  mg/dL


 


Total Bilirubin    0.70  (0.1-1.2)  mg/dL


 


AST    49 H  (5-40)  units/L


 


ALT    27  (7-56)  units/L


 


Alkaline Phosphatase    104  ()  units/L


 


Total Creatine Kinase    2251 H  ()  units/L


 


Total Protein    7.8  (6.3-8.2)  g/dL


 


Albumin    4.2  (3.9-5)  g/dL


 


Albumin/Globulin Ratio    1.2  %


 


TSH     (0.270-4.200)  mlU/mL


 


Urine Color     (Yellow)  


 


Urine Turbidity     (Clear)  


 


Urine pH     (5.0-7.0)  


 


Ur Specific Gravity     (1.003-1.030)  


 


Urine Protein     (Negative)  mg/dL


 


Urine Glucose (UA)     (Negative)  mg/dL


 


Urine Ketones     (Negative)  mg/dL


 


Urine Blood     (Negative)  


 


Urine Nitrite     (Negative)  


 


Urine Bilirubin     (Negative)  


 


Urine Urobilinogen     (<2.0)  mg/dL


 


Ur Leukocyte Esterase     (Negative)  


 


Urine WBC (Auto)     (0.0-6.0)  /HPF


 


Urine RBC (Auto)     (0.0-6.0)  /HPF


 


U Epithel Cells (Auto)     (0-13.0)  /HPF


 


Urine Bacteria (Auto)     (Negative)  /HPF


 


Urine Mucus     /HPF


 


Salicylates     (2.8-20.0)  mg/dL


 


Urine Opiates Screen     


 


Urine Methadone Screen     


 


Acetaminophen     (10.0-30.0)  ug/mL


 


Ur Barbiturates Screen     


 


Ur Phencyclidine Scrn     


 


Ur Amphetamines Screen     


 


U Benzodiazepines Scrn     


 


Urine Cocaine Screen     


 


U Marijuana (THC) Screen     


 


Drugs of Abuse Note     


 


Plasma/Serum Alcohol  < 0.01    (0-0.07)  %














  05/30/18 05/30/18 05/30/18 Range/Units





  08:15 08:56 08:56 


 


WBC     (4.5-11.0)  K/mm3


 


RBC     (3.65-5.03)  M/mm3


 


Hgb     (10.1-14.3)  gm/dl


 


Hct     (30.3-42.9)  %


 


MCV     (79-97)  fl


 


MCH     (28-32)  pg


 


MCHC     (30-34)  %


 


RDW     (13.2-15.2)  %


 


Plt Count     (140-440)  K/mm3


 


Sodium     (137-145)  mmol/L


 


Potassium     (3.6-5.0)  mmol/L


 


Chloride     ()  mmol/L


 


Carbon Dioxide     (22-30)  mmol/L


 


Anion Gap     mmol/L


 


BUN     (7-17)  mg/dL


 


Creatinine     (0.7-1.2)  mg/dL


 


Estimated GFR     ml/min


 


BUN/Creatinine Ratio     %


 


Glucose     ()  mg/dL


 


Lactic Acid     (0.7-2.0)  mmol/L


 


Calcium     (8.4-10.2)  mg/dL


 


Magnesium  2.20    (1.7-2.3)  mg/dL


 


Total Bilirubin     (0.1-1.2)  mg/dL


 


AST     (5-40)  units/L


 


ALT     (7-56)  units/L


 


Alkaline Phosphatase     ()  units/L


 


Total Creatine Kinase     ()  units/L


 


Total Protein     (6.3-8.2)  g/dL


 


Albumin     (3.9-5)  g/dL


 


Albumin/Globulin Ratio     %


 


TSH     (0.270-4.200)  mlU/mL


 


Urine Color    Karen  (Yellow)  


 


Urine Turbidity    Hazy  (Clear)  


 


Urine pH    5.0  (5.0-7.0)  


 


Ur Specific Gravity    1.026  (1.003-1.030)  


 


Urine Protein    100 mg/dl  (Negative)  mg/dL


 


Urine Glucose (UA)    Neg  (Negative)  mg/dL


 


Urine Ketones    20  (Negative)  mg/dL


 


Urine Blood    Sm  (Negative)  


 


Urine Nitrite    Pos  (Negative)  


 


Urine Bilirubin    Neg  (Negative)  


 


Urine Urobilinogen    2.0  (<2.0)  mg/dL


 


Ur Leukocyte Esterase    Neg  (Negative)  


 


Urine WBC (Auto)    2.0  (0.0-6.0)  /HPF


 


Urine RBC (Auto)    5.0  (0.0-6.0)  /HPF


 


U Epithel Cells (Auto)    < 1.0  (0-13.0)  /HPF


 


Urine Bacteria (Auto)    4+  (Negative)  /HPF


 


Urine Mucus    3+  /HPF


 


Salicylates     (2.8-20.0)  mg/dL


 


Urine Opiates Screen   Presumptive negative   


 


Urine Methadone Screen   Presumptive negative   


 


Acetaminophen     (10.0-30.0)  ug/mL


 


Ur Barbiturates Screen   Presumptive negative   


 


Ur Phencyclidine Scrn   Presumptive negative   


 


Ur Amphetamines Screen   Presumptive negative   


 


U Benzodiazepines Scrn   Presumptive negative   


 


Urine Cocaine Screen   Presumptive negative   


 


U Marijuana (THC) Screen   Presumptive negative   


 


Drugs of Abuse Note   Disclamer   


 


Plasma/Serum Alcohol     (0-0.07)  %














  05/30/18 Range/Units





  12:24 


 


WBC   (4.5-11.0)  K/mm3


 


RBC   (3.65-5.03)  M/mm3


 


Hgb   (10.1-14.3)  gm/dl


 


Hct   (30.3-42.9)  %


 


MCV   (79-97)  fl


 


MCH   (28-32)  pg


 


MCHC   (30-34)  %


 


RDW   (13.2-15.2)  %


 


Plt Count   (140-440)  K/mm3


 


Sodium   (137-145)  mmol/L


 


Potassium   (3.6-5.0)  mmol/L


 


Chloride   ()  mmol/L


 


Carbon Dioxide   (22-30)  mmol/L


 


Anion Gap   mmol/L


 


BUN   (7-17)  mg/dL


 


Creatinine   (0.7-1.2)  mg/dL


 


Estimated GFR   ml/min


 


BUN/Creatinine Ratio   %


 


Glucose   ()  mg/dL


 


Lactic Acid  1.30  (0.7-2.0)  mmol/L


 


Calcium   (8.4-10.2)  mg/dL


 


Magnesium   (1.7-2.3)  mg/dL


 


Total Bilirubin   (0.1-1.2)  mg/dL


 


AST   (5-40)  units/L


 


ALT   (7-56)  units/L


 


Alkaline Phosphatase   ()  units/L


 


Total Creatine Kinase   ()  units/L


 


Total Protein   (6.3-8.2)  g/dL


 


Albumin   (3.9-5)  g/dL


 


Albumin/Globulin Ratio   %


 


TSH   (0.270-4.200)  mlU/mL


 


Urine Color   (Yellow)  


 


Urine Turbidity   (Clear)  


 


Urine pH   (5.0-7.0)  


 


Ur Specific Gravity   (1.003-1.030)  


 


Urine Protein   (Negative)  mg/dL


 


Urine Glucose (UA)   (Negative)  mg/dL


 


Urine Ketones   (Negative)  mg/dL


 


Urine Blood   (Negative)  


 


Urine Nitrite   (Negative)  


 


Urine Bilirubin   (Negative)  


 


Urine Urobilinogen   (<2.0)  mg/dL


 


Ur Leukocyte Esterase   (Negative)  


 


Urine WBC (Auto)   (0.0-6.0)  /HPF


 


Urine RBC (Auto)   (0.0-6.0)  /HPF


 


U Epithel Cells (Auto)   (0-13.0)  /HPF


 


Urine Bacteria (Auto)   (Negative)  /HPF


 


Urine Mucus   /HPF


 


Salicylates   (2.8-20.0)  mg/dL


 


Urine Opiates Screen   


 


Urine Methadone Screen   


 


Acetaminophen   (10.0-30.0)  ug/mL


 


Ur Barbiturates Screen   


 


Ur Phencyclidine Scrn   


 


Ur Amphetamines Screen   


 


U Benzodiazepines Scrn   


 


Urine Cocaine Screen   


 


U Marijuana (THC) Screen   


 


Drugs of Abuse Note   


 


Plasma/Serum Alcohol   (0-0.07)  %

## 2018-05-30 NOTE — HISTORY AND PHYSICAL REPORT
History of Present Illness


Chief complaint: 





Confused,


History of present illness: 


42 YO Female with HTN, Schizophrenia, Bipolar, Suicide Attempt presents to ED 

for evaluation. Pt confused, and unable to provide detailed history. Pt history 

taken from medical record, ED staff, and EMS. Pt was found to be "Acting Out" 

today, and EMS was notified and upon arrival the patient was found to be 

confused. Pt transported to [facility] for further care and evaluation. Pt seen and 

evaluated in ED and found to have UTI complicated by sepsis, as well as 

rhabdomyolysis, and Bipolar Disorder with celeste. Pt admitted to medical floor, 

and treated I[facility] sepsis protocol. Psychiatry consulted in ED. No reports of fever

, chills, CP, Palpitations, NVD, Trauma, Syncope, BRBPR, unintentional weight 

loss, night sweats, productive cough. 1013 placed in ED.





Past History


Past Medical History: hypertension, other


Past Surgical History: hysterectomy


Social history: , lives with family


Family history: no significant family history





Medications and Allergies


 Allergies











Allergy/AdvReac Type Severity Reaction Status Date / Time


 


No Known Allergies Allergy   Verified 05/01/17 05:08











 Home Medications











 Medication  Instructions  Recorded  Confirmed  Last Taken  Type


 


Benztropine [Cogentin] 2 mg PO DAILY 02/20/17 05/30/18 Unknown History


 


Hydrochlorothiazide [HCTZ] 25 mg PO QDAY 02/20/17 05/30/18 Unknown History


 


Phenytoin (25 mg/ml) [Dilantin] 100 mg PO Q8HR #90 tab 05/06/17 05/30/18 

Unknown Rx


 


FLUoxetine HCL [Prozac] 20 mg PO DAILY 05/30/18 05/30/18 Unknown History


 


Olanzapine [OLANZapine] 30 mg PO HS 05/30/18 05/30/18 Unknown History


 


traZODone [Desyrel] 100 mg PO QHS 05/30/18 05/30/18 Unknown History











Active Meds: 


Active Medications





Haloperidol Lactate (Haldol)  5 mg IM Q6HR PRN


   PRN Reason: Agitation


   Last Admin: 05/30/18 09:11 Dose:  5 mg


Potassium Chloride (Kcl 10meq/100ml)  10 meq in 100 mls @ 100 mls/hr IV Q1H SADAF


   Stop: 05/30/18 13:59


   Last Admin: 05/30/18 11:47 Dose:  100 mls/hr


Ceftriaxone Sodium 1 gm/ (Sodium Chloride)  20 mls @ 2 mls/min IV Q24HR SADAF


Lorazepam (Ativan)  2 mg IM Q4HR PRN


   PRN Reason: Agitation


   Last Admin: 05/30/18 09:10 Dose:  2 mg











Review of Systems


ROS unobtainable: due to mental status





Exam





- Constitutional


Vitals: 


 











Temp Pulse Resp BP Pulse Ox


 


 99.6 F   95 H  24   112/59   97 


 


 05/30/18 08:46  05/30/18 09:30  05/30/18 08:46  05/30/18 09:30  05/30/18 08:46











General appearance: Present: mild distress, obese, disheveled





- EENT


Eyes: Present: PERRL


ENT: hearing intact, clear oral mucosa





- Neck


Neck: Present: supple, normal ROM





- Respiratory


Respiratory effort: normal


Respiratory: bilateral: CTA





- Cardiovascular


Rhythm: other (tachycardia)


Heart Sounds: Present: S1 & S2.  Absent: rub, click





- Extremities


Extremities: pulses symmetrical, No edema


Peripheral Pulses: abnormal (capillary refill greater than 3.6 seconds)





- Abdominal


General gastrointestinal: Present: soft, non-tender, non-distended, normal 

bowel sounds


Female genitourinary: Present: normal





- Integumentary


Integumentary: Present: clear, dry, clammy, decreased turgor





- Musculoskeletal


Musculoskeletal: generalized weakness





- Psychiatric


Psychiatric: no intact judgment & insight, no memory intact





- Neurologic


Neurologic: no gait normal





Results





- Labs


CBC & Chem 7: 


 05/30/18 08:15





 05/30/18 08:15


Labs: 


 Abnormal lab results











  05/30/18 05/30/18 05/30/18 Range/Units





  05:27 05:27 08:15 


 


WBC    14.5 H  (4.5-11.0)  K/mm3


 


RDW    13.1 L  (13.2-15.2)  %


 


Plt Count    457 H  (140-440)  K/mm3


 


Potassium     (3.6-5.0)  mmol/L


 


Creatinine     (0.7-1.2)  mg/dL


 


Glucose     ()  mg/dL


 


AST     (5-40)  units/L


 


Total Creatine Kinase     ()  units/L


 


Salicylates  < 0.3 L    (2.8-20.0)  mg/dL


 


Acetaminophen   < 5.0 L   (10.0-30.0)  ug/mL














  05/30/18 Range/Units





  08:15 


 


WBC   (4.5-11.0)  K/mm3


 


RDW   (13.2-15.2)  %


 


Plt Count   (140-440)  K/mm3


 


Potassium  3.1 L  (3.6-5.0)  mmol/L


 


Creatinine  0.6 L  (0.7-1.2)  mg/dL


 


Glucose  126 H  ()  mg/dL


 


AST  49 H  (5-40)  units/L


 


Total Creatine Kinase  2251 H  ()  units/L


 


Salicylates   (2.8-20.0)  mg/dL


 


Acetaminophen   (10.0-30.0)  ug/mL














Assessment and Plan





- Patient Problems


(1) Sepsis


Current Visit: Yes   Status: Acute   


Qualifiers: 


   Sepsis type: sepsis due to unspecified organism   Qualified Code(s): A41.9 - 

Sepsis, unspecified organism   


Plan to address problem: 


Sepsis Protocol: IV antibiotics, blood cultures, urinalysis, chest x ray, 

serial lactic acid, monitor uop q shift, IVF resuscitation, 








(2) UTI (urinary tract infection)


Current Visit: Yes   Status: Acute   


Qualifiers: 


   Indwelling urinary catheter type: unspecified 


Plan to address problem: 


IV antibiotics, urinalysis, CBC,BMP








(3) Rhabdomyolysis


Current Visit: Yes   Status: Acute   


Qualifiers: 


   Encounter type: initial encounter 


Plan to address problem: 


IVF resuscitation, repeat ck level in am, will consider IV bicarbonate if 

elevation in CK level








(4) Bipolar 1 disorder with moderate celeste


Current Visit: Yes   Status: Acute   


Plan to address problem: 


psychiatry consulted in ED, 








(5) Hypertensive encephalopathy


Current Visit: Yes   Status: Acute   


Plan to address problem: 


monitor bp q shift, resume prehospital medication. 








(6) Psychosis


Current Visit: No   Status: Acute   


Qualifiers: 


   Psychosis type: unspecified psychosis type   Qualified Code(s): F29 - 

Unspecified psychosis not due to a substance or known physiological condition   





(7) DVT prophylaxis


Current Visit: Yes   Status: Acute   


Plan to address problem: 


scd to ble while in bed

## 2018-05-30 NOTE — CAT SCAN REPORT
CT HEAD WITHOUT CONTRAST:



HISTORY:  Altered mental status.



TECHNIQUE:  Sequential 2.5mm CT images.



COMPARISON: 5/6/17.



FINDINGS:



Cerebral Parenchyma: Within normal limits.



Cerebellum:  Within normal limits.



Brainstem:  Within normal limits.



Ventricles: Normal.



Sella:  Normal.



Extra-axial spaces:  Normal.



Basal Cisterns:  Normal.



Intracranial Hemorrhage:  None.



Midline Shift:  None.



Calvarium: Normal.



Sinuses: Normal.



Mastoid Air Cells:  Normal.



Visualized Orbits:  Normal.







IMPRESSION:

Cranial CT scan within normal limits.

## 2018-05-31 PROCEDURE — 3E0234Z INTRODUCTION OF SERUM, TOXOID AND VACCINE INTO MUSCLE, PERCUTANEOUS APPROACH: ICD-10-PCS | Performed by: INTERNAL MEDICINE

## 2018-05-31 RX ADMIN — PHENYTOIN SCH MG: 100 SUSPENSION ORAL at 05:51

## 2018-05-31 RX ADMIN — Medication SCH: at 12:10

## 2018-05-31 RX ADMIN — SODIUM CHLORIDE SCH MLS/HR: 0.45 INJECTION, SOLUTION INTRAVENOUS at 07:10

## 2018-05-31 RX ADMIN — Medication SCH ML: at 21:07

## 2018-05-31 RX ADMIN — CEFTRIAXONE SODIUM SCH MLS/MIN: 10 INJECTION, POWDER, FOR SOLUTION INTRAVENOUS at 12:10

## 2018-05-31 RX ADMIN — HYDROCHLOROTHIAZIDE SCH MG: 25 TABLET ORAL at 12:09

## 2018-05-31 RX ADMIN — TRAZODONE HYDROCHLORIDE SCH MG: 100 TABLET ORAL at 21:07

## 2018-05-31 RX ADMIN — PHENYTOIN SCH MG: 100 SUSPENSION ORAL at 13:32

## 2018-05-31 RX ADMIN — PHENYTOIN SCH MG: 100 SUSPENSION ORAL at 21:07

## 2018-05-31 NOTE — CONSULTATION
History of Present Illness





- Reason for Consult


Consult date: 05/31/18


Reason for consult: Mental Health Evaluation


Requesting physician: ROBERTO CARLOS BRADFORD





- Chief Complaint


Chief complaint: 


"AMS"





- History of Present Psychiatric Illness


43 y.o. AA female presenting to the ER for AMS. Today the patient was confused 

and could not answer any questions. Per the sitter, no acute agitation by the 

patient. 








Medications and Allergies


 Allergies











Allergy/AdvReac Type Severity Reaction Status Date / Time


 


No Known Allergies Allergy   Verified 05/01/17 05:08











 Home Medications











 Medication  Instructions  Recorded  Confirmed  Last Taken  Type


 


Benztropine [Cogentin] 2 mg PO DAILY 02/20/17 05/30/18 Unknown History


 


Hydrochlorothiazide [HCTZ] 25 mg PO QDAY 02/20/17 05/30/18 Unknown History


 


Phenytoin (25 mg/ml) [Dilantin] 100 mg PO Q8HR #90 tab 05/06/17 05/30/18 

Unknown Rx


 


FLUoxetine HCL [Prozac] 20 mg PO DAILY 05/30/18 05/30/18 Unknown History


 


Olanzapine [OLANZapine] 30 mg PO HS 05/30/18 05/30/18 Unknown History


 


traZODone [Desyrel] 100 mg PO QHS 05/30/18 05/30/18 Unknown History











Active Meds: 


Active Medications





Acetaminophen (Tylenol)  650 mg PO Q4H PRN


   PRN Reason: Pain MILD(1-3)/Fever >100.5/HA


Albuterol (Proventil)  2.5 mg IH Q4HRT PRN


   PRN Reason: Shortness Of Breath


Benztropine Mesylate (Cogentin)  2 mg PO DAILY SADAF


Fluoxetine HCl (Prozac)  20 mg PO DAILY SADAF


Haloperidol Lactate (Haldol)  5 mg IM Q6HR PRN


   PRN Reason: Agitation


   Last Admin: 05/30/18 09:11 Dose:  5 mg


Hydralazine HCl (Apresoline)  10 mg IV Q4HR PRN


   PRN Reason: Hypertension


Hydrochlorothiazide (Hctz)  25 mg PO QDAY [facility]


Ceftriaxone Sodium 1 gm/ (Sodium Chloride)  20 mls @ 2 mls/min IV Q24HR SADAF


   Last Admin: 05/30/18 13:17 Dose:  2 mls/min


Sodium Chloride (Nacl 0.45%)  1,000 mls @ 150 mls/hr IV AS DIRECT SADAF


   Stop: 05/31/18 14:59


   Last Admin: 05/31/18 07:10 Dose:  150 mls/hr


Influenza Virus Vaccine Quadrival (Fluarix Quad [id number](36 Mos+)  0.5 ml IM 

.ONCE ONE


   Stop: 05/31/18 12:01


Lorazepam (Ativan)  2 mg IM Q4HR PRN


   PRN Reason: Agitation


   Last Admin: 05/30/18 09:10 Dose:  2 mg


Olanzapine (Zyprexa)  30 mg PO HS [facility]


   Last Admin: 05/30/18 22:30 Dose:  30 mg


Ondansetron HCl (Zofran)  4 mg IV Q8H PRN


   PRN Reason: Nausea And Vomiting


Phenytoin (Dilantin)  100 mg PO Q8HR [facility]


   Last Admin: 05/31/18 05:51 Dose:  100 mg


Sodium Chloride (Sodium Chloride Flush Syringe 10 Ml)  10 ml IV BID [facility]


   Last Admin: 05/30/18 22:30 Dose:  10 ml


Sodium Chloride (Sodium Chloride Flush Syringe 10 Ml)  10 ml IV PRN PRN


   PRN Reason: LINE FLUSH


Trazodone HCl (Desyrel)  100 mg PO QHS [facility]


   Last Admin: 05/30/18 22:30 Dose:  100 mg











Past psychiatric history





- Past Medical History


Past Medical History: other (Unable to obtain)


Past Surgical History: Other (Unable to obtain)





- past Psychiatric treatment and history


psychiatric treatment history: 


Unable to obtain a psy hx and a fam psy hx.








- Social History


Social history: other (Unable to obtain )





Mental Status Exam





- Vital signs


 Last Vital Signs











Temp  98.8 F   05/30/18 22:39


 


Pulse  102 H  05/30/18 22:39


 


Resp  24   05/30/18 22:39


 


BP  141/83   05/30/18 22:39


 


Pulse Ox  97   05/30/18 22:39














- Exam


Narrative exam: 


The MSE could not be completed because of the patient's condition.








Results


Result Diagrams: 


 05/30/18 08:15





 05/30/18 08:15


 Abnormal lab results











  05/30/18 Range/Units





  13:05 


 


Phenytoin  0.8 L  (10.0-20.0)  ug/mL








All other labs normal.








Assessment and Plan


Assessment and plan: 


Impression: Today the patient was confused and could not answer any questions. 

CK 2251. 





Medical: Sepsis





Recommendation/Plan: Continue 1013 and reassess the patient in 24 hours. Also, 

gather collateral information from spouse reference medication regimen and psy 

hx. Continue Haldol 5 mg IM Q6hrs PRN for acute agitation. CK ordered. 

Recommend Delirium precautions below:





1. Frequently reorient patient and involve him/her in their care (simple 

explanations of procedures, tests, medications).


2. Lights on and shades open during daytime hours.


3. Write date and goals of care in a visible place.


4. Try to avoid unnecessary interruptions to sleep during nighttime hours.


5. Obtain glasses, hearing aids from home if patient uses these at baseline.


6. Avoid medications that may exacerbate delirium (especially narcotics, 

benzodiazepines, barbiturates, ambien, lunesta, and medications with excessive 

anticholinergic properties).

## 2018-05-31 NOTE — PROGRESS NOTE
Assessment and Plan


/Acute Encephalopathy


Multifactorial, likely from acute psychosis vs sepsis vs uncontrolled BP


treat underlying cause





/Sepsis due to UTI


Initiated Sepsis Protocol: IV antibiotics, blood cultures, urinalysis, chest x 

ray, serial lactic acid, monitor uop q shift, IVF resuscitation, 








/Current Visit: Yes   Status: Acute   


IV antibiotics, urinalysis, CBC,BMP








/Rhabdomyolysis


IVF resuscitation, repeat ck level in am, will consider IV bicarbonate if 

elevation in CK level








/ Bipolar 1 disorder with moderate celeste 


psychiatry consulted in ED, 








/ Hypertension, uncontrolled


monitor bp q shift, resume prehospital medication. 








/ DVT prophylaxis


scd to ble while in bed








Physical exam:





General appearance: Present: no distress, obese, disheveled





- EENT


Eyes: Present: PERRL


ENT: hearing intact, clear oral mucosa





- Neck


Neck: Present: supple, normal ROM





- Respiratory


Respiratory effort: normal


Respiratory: bilateral: CTA





- Cardiovascular


Rhythm: other (tachycardia)


Heart Sounds: Present: S1 & S2.  Absent: rub, click





- Extremities


Extremities: pulses symmetrical, No edema





- Abdominal


General gastrointestinal: Present: soft, non-tender, non-distended, normal 

bowel sounds


Female genitourinary: Present: normal





- Integumentary


Integumentary: Present: clear, dry, clammy, decreased turgor





- Musculoskeletal


Musculoskeletal: generalized weakness





- Psychiatric


Psychiatric: no intact judgment & insight, 





- Neurologic


Neurologic: no focal deficit











Subjective


Date of service: 05/31/18


Interval history: 





Pt seen and examined


appeared to have no agitation


denies any chest pain











Objective





- Constitutional


Vitals: 


 Vital Signs - 12hr











  05/31/18 05/31/18 05/31/18





  09:00 09:39 14:43


 


Temperature 98.1 F  


 


Pulse Rate 108 H 83 


 


Respiratory 18  





Rate   


 


Blood Pressure 147/86  





[Right]   


 


O2 Sat by Pulse  99 99





Oximetry   














- Labs


CBC & Chem 7: 


 05/30/18 08:15





 05/30/18 08:15


Labs: 


 Abnormal lab results











  05/31/18 Range/Units





  10:19 


 


Total Creatine Kinase  1351 H  ()  units/L

## 2018-06-01 LAB
BUN SERPL-MCNC: 8 MG/DL (ref 7–17)
BUN/CREAT SERPL: 11 %
CALCIUM SERPL-MCNC: 9.1 MG/DL (ref 8.4–10.2)
HCT VFR BLD CALC: 39.7 % (ref 30.3–42.9)
HEMOLYSIS INDEX: 3
HGB BLD-MCNC: 13.3 GM/DL (ref 10.1–14.3)
MCH RBC QN AUTO: 29 PG (ref 28–32)
MCHC RBC AUTO-ENTMCNC: 33 % (ref 30–34)
MCV RBC AUTO: 85 FL (ref 79–97)
PLATELET # BLD: 416 K/MM3 (ref 140–440)
RBC # BLD AUTO: 4.67 M/MM3 (ref 3.65–5.03)

## 2018-06-01 RX ADMIN — Medication SCH ML: at 22:46

## 2018-06-01 RX ADMIN — PHENYTOIN SCH MG: 100 SUSPENSION ORAL at 14:37

## 2018-06-01 RX ADMIN — Medication SCH ML: at 09:44

## 2018-06-01 RX ADMIN — PHENYTOIN SCH MG: 100 SUSPENSION ORAL at 05:38

## 2018-06-01 RX ADMIN — CEFTRIAXONE SODIUM SCH MLS/MIN: 10 INJECTION, POWDER, FOR SOLUTION INTRAVENOUS at 09:44

## 2018-06-01 RX ADMIN — HALOPERIDOL LACTATE PRN MG: 5 INJECTION, SOLUTION INTRAMUSCULAR at 09:45

## 2018-06-01 RX ADMIN — BENZTROPINE MESYLATE SCH MG: 0.5 TABLET ORAL at 22:43

## 2018-06-01 RX ADMIN — TRAZODONE HYDROCHLORIDE SCH MG: 100 TABLET ORAL at 22:43

## 2018-06-01 RX ADMIN — HYDROCHLOROTHIAZIDE SCH MG: 25 TABLET ORAL at 09:44

## 2018-06-01 RX ADMIN — PHENYTOIN SCH MG: 100 SUSPENSION ORAL at 22:43

## 2018-06-01 RX ADMIN — HALOPERIDOL LACTATE PRN MG: 5 INJECTION, SOLUTION INTRAMUSCULAR at 18:27

## 2018-06-01 NOTE — PROGRESS NOTE
Assessment and Plan


/Acute Encephalopathy


Multifactorial, likely from acute psychosis vs sepsis vs uncontrolled BP


treat underlying cause





/Sepsis due to UTI


Initiated Sepsis Protocol: IV antibiotics, blood cultures, urinalysis, chest x 

ray, serial lactic acid, monitor uop q shift, IVF resuscitation, 








/Rhabdomyolysis


IVF resuscitation, repeat ck level in am, will consider IV bicarbonate if 

elevation in CK level








/ Bipolar 1 disorder with moderate celeste 


psychiatry consulted in ED, on 1013








/ Hypertension, uncontrolled


monitor bp q shift, on HcTZ and norvasc





/ DVT prophylaxis


scd to ble while in bed








Physical exam:





General appearance: Present: no distress, obese, disheveled





- EENT


Eyes: Present: PERRL


ENT: hearing intact, clear oral mucosa





- Neck


Neck: Present: supple, normal ROM





- Respiratory


Respiratory effort: normal


Respiratory: bilateral: CTA





- Cardiovascular


Rhythm: other (tachycardia)


Heart Sounds: Present: S1 & S2.  Absent: rub, click





- Extremities


Extremities: pulses symmetrical, No edema





- Abdominal


General gastrointestinal: Present: soft, non-tender, non-distended, normal 

bowel sounds


Female genitourinary: Present: normal





- Integumentary


Integumentary: Present: clear, dry, clammy, decreased turgor





- Musculoskeletal


Musculoskeletal: generalized weakness





- Psychiatric


Psychiatric: no intact judgment & insight, 





- Neurologic


Neurologic: no focal deficit











Subjective


Date of service: 06/01/18


Interval history: 





Pt seen and examined


appeared to have no agitation


denies any chest pain











Objective





- Constitutional


Vitals: 


 Vital Signs - 12hr











  06/01/18 06/01/18





  09:19 18:19


 


Temperature 98.4 F 98.4 F


 


Pulse Rate 115 H 116 H


 


Respiratory 18 18





Rate  


 


Blood Pressure 151/101 146/86


 


O2 Sat by Pulse 97 97





Oximetry  














- Labs


CBC & Chem 7: 


 06/01/18 13:12





 06/01/18 13:12


Labs: 


 Abnormal lab results











  06/01/18 06/01/18 06/01/18 Range/Units





  13:12 13:12 13:12 


 


RDW   13.0 L   (13.2-15.2)  %


 


Sodium    136 L  (137-145)  mmol/L


 


Potassium    3.3 L  (3.6-5.0)  mmol/L


 


Chloride    97.2 L  ()  mmol/L


 


Glucose    106 H  ()  mg/dL


 


Total Creatine Kinase  1005 H    ()  units/L

## 2018-06-01 NOTE — PROGRESS NOTE
Subjective





- Reason for Consult


Consult date: 06/01/18


Reason for consult: Psychiatry Follow-up





- Chief Complaint


Chief complaint: 


"I don't remember everything"





43 y.o. AA female presenting to the ER for AMS. Today the patient is clam and 

cooperative during the assessment. She stated that she don't remember 

everything that happened to her prior to being admitted to the hospital. She 

stated that she  stop taking her psy medications, but could not explain why. 

Also, she stated that she had not slept for 2 days. She stated having a hx of 

Bipolar DO and take Zyprexa, Prozac, and Trazodone. She was able to ID the 

current/past [place] Presidents, her current location, and recall 3/3 numbers within 

5 mins. She stated experiencing this type of "episode" in the past. She stated 

previous inpatient psy services for stabilization. She denies SI/HI's and AVH'

s. She denies a poor appetite. She denies recreational drug use and alcohol 

consumption (etoh). The patient stated that she is seen by Dr Costa for 

outpatient psy services. 





Mental Status Exam





- Vital signs


 Last Vital Signs











Temp  98.4 F   06/01/18 09:19


 


Pulse  115 H  06/01/18 09:19


 


Resp  18   06/01/18 09:19


 


BP  151/101   06/01/18 09:19


 


Pulse Ox  97   06/01/18 09:19














- Exam


Narrative exam: 


MSE:





 Appearance: calm, cooperative


 Behavior: regular eye contact


 Speech: regular rate and tone


 Mood: "okay"


 Affect: congruent to mood


 Thought Process: circumstantial          


 Thought Content: denies SI/HI's and AVH's  


 Motor Activity: lying in bed


 Cognition: A/O x 3


 Insight: variable 


 Judgment: variable    





























Assessment and Plan


  Impression: Hx of Bipolar DO per the patient. Today the patient is calm and 

cooperative during the assessment. UDS negative. 





Medical: Sepsis





Recommendations/Plan: Evaluate 1013 in 24 hours and gather collateral 

information to determine proper dispo. Start Zyprexa 5 mg PO HS for mood and 

Cogentin 0.5 mg PO HS for EPS prevention. Continue Prozac 20 mg PO daily for 

depression and Trazodone 100 mg PO HS for sleep. Discussed possible metabolic 

side effects of Zyprexa with patient. Discussed possible suicidality/medication 

induced celeste with patient reference Trazodone/Prozac.

## 2018-06-02 LAB
BUN SERPL-MCNC: 9 MG/DL (ref 7–17)
BUN/CREAT SERPL: 13 %
CALCIUM SERPL-MCNC: 9.1 MG/DL (ref 8.4–10.2)
HEMOLYSIS INDEX: 11

## 2018-06-02 RX ADMIN — PHENYTOIN SCH MG: 100 SUSPENSION ORAL at 15:17

## 2018-06-02 RX ADMIN — AMLODIPINE BESYLATE SCH MG: 10 TABLET ORAL at 09:38

## 2018-06-02 RX ADMIN — MEROPENEM SCH MLS/HR: 500 INJECTION, POWDER, FOR SOLUTION INTRAVENOUS at 15:17

## 2018-06-02 RX ADMIN — PHENYTOIN SCH MG: 100 SUSPENSION ORAL at 06:10

## 2018-06-02 RX ADMIN — Medication SCH ML: at 09:39

## 2018-06-02 RX ADMIN — HYDROCHLOROTHIAZIDE SCH MG: 25 TABLET ORAL at 09:38

## 2018-06-02 NOTE — PROGRESS NOTE
Assessment and Plan


Assessment and plan: 


--Sepsis due to UTI : ESBL+ ur cultures


Contact isolation,change antibiotic to Levaquin,Consult ID


On Sepsis Protocol: IV antibiotics, blood cultures, urinalysis, chest x ray, 


serial lactic acid, monitor uop q shift, IVF resuscitation, 





--Acute Encephalopathy


Multifactorial, likely from acute psychosis vs sepsis vs uncontrolled BP


treat underlying cause





--Rhabdomyolysis: improving


IVF resuscitation, repeat ck level in am, will consider IV bicarbonate if 

elevation in CK level





-- Bipolar 1 disorder with moderate celeste 


psychiatry consulted in ED, on 1013





-- Hypertension, uncontrolled


monitor bp q shift, on HcTZ and norvasc





--DVT prophylaxis


scd 








History


Interval history: 


Patient seen and examined medical records reviewed


No new events reported


Patient feels better no new complaints


Vital signs reviewed








Hospitalist Physical





- Constitutional


Vitals: 


 











Temp Pulse Resp BP Pulse Ox


 


 98.3 F   110 H  17   138/91   98 


 


 06/02/18 07:44  06/02/18 07:44  06/02/18 07:44  06/02/18 07:44  06/02/18 07:44











General appearance: Present: no acute distress, obese





- EENT


Eyes: Present: PERRL, EOM intact





- Neck


Neck: Present: supple, normal ROM





- Respiratory


Respiratory effort: normal


Respiratory: bilateral: diminished, negative: rales, rhonchi, wheezing





- Cardiovascular


Rhythm: regular


Heart Sounds: Present: S1 & S2





- Extremities


Extremities: no ischemia, No edema





- Abdominal


General gastrointestinal: soft, non-tender, non-distended, normal bowel sounds





- Integumentary


Integumentary: Present: clear, warm





- Psychiatric


Psychiatric: appropriate mood/affect, other (confused at times)





- Neurologic


Neurologic: CNII-XII intact, moves all extremities





Results





- Labs


CBC & Chem 7: 


 06/01/18 13:12





 06/03/18 15:26


Labs: 


 Laboratory Last Values











WBC  10.3 K/mm3 (4.5-11.0)   06/01/18  13:12    


 


RBC  4.67 M/mm3 (3.65-5.03)   06/01/18  13:12    


 


Hgb  13.3 gm/dl (10.1-14.3)   06/01/18  13:12    


 


Hct  39.7 % (30.3-42.9)   06/01/18  13:12    


 


MCV  85 fl (79-97)   06/01/18  13:12    


 


MCH  29 pg (28-32)   06/01/18  13:12    


 


MCHC  33 % (30-34)   06/01/18  13:12    


 


RDW  13.0 % (13.2-15.2)  L  06/01/18  13:12    


 


Plt Count  416 K/mm3 (140-440)   06/01/18  13:12    


 


Sodium  137 mmol/L (137-145)   06/02/18  03:10    


 


Potassium  3.6 mmol/L (3.6-5.0)   06/02/18  03:10    


 


Chloride  97.6 mmol/L ()  L  06/02/18  03:10    


 


Carbon Dioxide  27 mmol/L (22-30)   06/02/18  03:10    


 


Anion Gap  16 mmol/L  06/02/18  03:10    


 


BUN  9 mg/dL (7-17)   06/02/18  03:10    


 


Creatinine  0.7 mg/dL (0.7-1.2)   06/02/18  03:10    


 


Estimated GFR  > 60 ml/min  06/02/18  03:10    


 


BUN/Creatinine Ratio  13 %  06/02/18  03:10    


 


Glucose  120 mg/dL ()  H  06/02/18  03:10    


 


Lactic Acid  0.90 mmol/L (0.7-2.0)   05/30/18  20:01    


 


Calcium  9.1 mg/dL (8.4-10.2)   06/02/18  03:10    


 


Magnesium  2.20 mg/dL (1.7-2.3)   05/30/18  08:15    


 


Total Bilirubin  0.70 mg/dL (0.1-1.2)   05/30/18  08:15    


 


AST  49 units/L (5-40)  H  05/30/18  08:15    


 


ALT  27 units/L (7-56)   05/30/18  08:15    


 


Alkaline Phosphatase  104 units/L ()   05/30/18  08:15    


 


Total Creatine Kinase  794 units/L ()  H  06/02/18  03:10    


 


Total Protein  7.8 g/dL (6.3-8.2)   05/30/18  08:15    


 


Albumin  4.2 g/dL (3.9-5)   05/30/18  08:15    


 


Albumin/Globulin Ratio  1.2 %  05/30/18  08:15    


 


TSH  0.488 mlU/mL (0.270-4.200)   05/30/18  05:27    


 


Urine Color  Karen  (Yellow)   05/30/18  08:56    


 


Urine Turbidity  Hazy  (Clear)   05/30/18  08:56    


 


Urine pH  5.0  (5.0-7.0)   05/30/18  08:56    


 


Ur Specific Gravity  1.026  (1.003-1.030)   05/30/18  08:56    


 


Urine Protein  100 mg/dl mg/dL (Negative)   05/30/18  08:56    


 


Urine Glucose (UA)  Neg mg/dL (Negative)   05/30/18  08:56    


 


Urine Ketones  20 mg/dL (Negative)   05/30/18  08:56    


 


Urine Blood  Sm  (Negative)   05/30/18  08:56    


 


Urine Nitrite  Pos  (Negative)   05/30/18  08:56    


 


Urine Bilirubin  Neg  (Negative)   05/30/18  08:56    


 


Urine Urobilinogen  2.0 mg/dL (<2.0)   05/30/18  08:56    


 


Ur Leukocyte Esterase  Neg  (Negative)   05/30/18  08:56    


 


Urine WBC (Auto)  2.0 /HPF (0.0-6.0)   05/30/18  08:56    


 


Urine RBC (Auto)  5.0 /HPF (0.0-6.0)   05/30/18  08:56    


 


U Epithel Cells (Auto)  < 1.0 /HPF (0-13.0)   05/30/18  08:56    


 


Urine Bacteria (Auto)  4+ /HPF (Negative)   05/30/18  08:56    


 


Urine Mucus  3+ /HPF  05/30/18  08:56    


 


Salicylates  < 0.3 mg/dL (2.8-20.0)  L  05/30/18  05:27    


 


Urine Opiates Screen  Presumptive negative   05/30/18  08:56    


 


Urine Methadone Screen  Presumptive negative   05/30/18  08:56    


 


Acetaminophen  < 5.0 ug/mL (10.0-30.0)  L  05/30/18  05:27    


 


Ur Barbiturates Screen  Presumptive negative   05/30/18  08:56    


 


Phenytoin  0.8 ug/mL (10.0-20.0)  L  05/30/18  13:05    


 


Ur Phencyclidine Scrn  Presumptive negative   05/30/18  08:56    


 


Ur Amphetamines Screen  Presumptive negative   05/30/18  08:56    


 


U Benzodiazepines Scrn  Presumptive negative   05/30/18  08:56    


 


Urine Cocaine Screen  Presumptive negative   05/30/18  08:56    


 


U Marijuana (THC) Screen  Presumptive negative   05/30/18  08:56    


 


Drugs of Abuse Note  Disclamer   05/30/18  08:56    


 


Plasma/Serum Alcohol  < 0.01 % (0-0.07)   05/30/18  05:27

## 2018-06-02 NOTE — ULTRASOUND REPORT
Renal sonogram:



History: Rule out stone, hydro-.



Findings:



Right kidney 11.5 x 4.9 x 4.4 cm. Cortical thickness is 1.4 cm.



Left kidney 9.7 x 5.1 x 5.7 cm. Cortical thickness 2 cm.



There is moderate dilatation noted of intrarenal collecting system of 

right kidney probably suggestive of mild hydronephrosis. No mass.



Impression:



Suspicion of mild hydronephrosis the right kidney.

## 2018-06-02 NOTE — PROGRESS NOTE
Subjective





- Reason for Consult


Consult date: 06/02/18


Reason for consult: follow up





- Chief Complaint


Chief complaint: 


"I'm good."





43 y.o. AA female presented to the ER for AMS. Today the patient is clam and 

cooperative during the assessment. She admitted to discontinuing her psych meds 

but does not say why.  She stated having a hx of Bipolar DO and takes Zyprexa, 

Prozac, and Trazodone at home. These were restarted. She states she does not 

need psychiatric hospitalization. She denies SI/HI's and AVH's. Although, she 

is talking and laughing to herself. She was observed walking around the room 

and the bathroom looking behind furniture. Her sitter reports she has been 

laughing to herself intermittently and talking to herself.





Mental Status Exam





- Vital signs


 Last Vital Signs











Temp  98.3 F   06/02/18 07:44


 


Pulse  110 H  06/02/18 07:44


 


Resp  17   06/02/18 07:44


 


BP  138/91   06/02/18 07:44


 


Pulse Ox  98   06/02/18 07:44














- Exam


Narrative exam: 





MSE:





 Appearance: calm


 Behavior: preoccupied


 Speech: regular rate and tone


 Mood: "okay"


 Affect: congruent to mood


 Thought Process: limited in scope         


 Thought Content: denies SI/HI's and AVH's. She was responding to internal 

stimuli and laughing to herself intermittently


 Motor Activity: walking around the room 


 Cognition: A/O x 3


 Insight: variable 


 Judgment: variable    






































Assessment and Plan





Impression: Hx of Bipolar DO per the patient. Today the patient is responding 

to internal stimuli. UDS negative. 





Medical: Sepsis





Recommendations/Plan: 


Continue 1013


Continue Zyprexa 5 mg PO HS for mood and Cogentin 0.5 mg PO HS for EPS 

prevention. Continue Prozac 20 mg PO daily for depression and Trazodone 100 mg 

PO HS for sleep.

## 2018-06-02 NOTE — CONSULTATION
History of Present Illness





- Reason for Consult


Consult date: 06/02/18


UTI


Requesting physician: AMY J KOCHERLA





- History of Present Illness


HPI: 44 yo F PMH HTN, Schizophrenia, Bipolar disorder, suicide attempt who 

presented to the ER 5/30/18 for evaluation. History is obtained from records 

and pt. As per records pt was confused, "acting out". She says she stopped 

taking her psych meds. EMS brought her to the ER.  In the ER temperature was 

98.7, pulse 119, respiratory rate 22, saturation 98%, blood pressure 210/125. 

Labs showed  WBC of 14.5, H&H 13.6 and 42.2 platelets 457.  BUN and creatinine 

8 and 0.6.  Lactic acid 1.3.  CK 2251.  Urine tox was negative.  UA showed 

positive nitrates, negative leukocyte esterase, WBC 2, RBC 5.  Blood cultures 

are negative today.  Urine culture came positive for ,000 CFU per mL of 

ESBL Escherichia coli.  CT of the head and chest x-ray were normal. She was 

started on ceftriaxone on 5/30.  She was changed to levofloxacin on 6/2.  She 

denies F/C, N/V/D, dysuria, hematuria, abdominal pain, cough, SOB. CP. 

Infectious disease service is consulted to help with further antibiotic 

management  








Microbiology:





Blood cultures:


5/30 NGTD





Urine cultures:


5/30 ESBL E coli (10-100k cfu/ml)








Current Antimicrobials:


Levofloxacin 6/2-





Previous Antimicrobials:


Ceftriaxone 5/30-6/1











Past History


Past Medical History: hypertension (Schizophrenia, bipolar disorder with celeste.)

, other


Past Surgical History: Other (Unable to obtain)


Social history: other (Unable to obtain )


Family history: no significant family history





Medications and Allergies


 Allergies











Allergy/AdvReac Type Severity Reaction Status Date / Time


 


No Known Allergies Allergy   Verified 05/01/17 05:08











 Home Medications











 Medication  Instructions  Recorded  Confirmed  Last Taken  Type


 


Benztropine [Cogentin] 2 mg PO DAILY 02/20/17 05/30/18 Unknown History


 


Hydrochlorothiazide [HCTZ] 25 mg PO QDAY 02/20/17 05/30/18 Unknown History


 


Phenytoin (25 mg/ml) [Dilantin] 100 mg PO Q8HR #90 tab 05/06/17 05/30/18 

Unknown Rx


 


FLUoxetine HCL [Prozac] 20 mg PO DAILY 05/30/18 05/30/18 Unknown History


 


Olanzapine [OLANZapine] 30 mg PO HS 05/30/18 05/30/18 Unknown History


 


traZODone [Desyrel] 100 mg PO QHS 05/30/18 05/30/18 Unknown History











Active Meds: 


Active Medications





Acetaminophen (Tylenol)  650 mg PO Q4H PRN


   PRN Reason: Pain MILD(1-3)/Fever >100.5/HA


   Last Admin: 06/01/18 22:42 Dose:  650 mg


Albuterol (Proventil)  2.5 mg IH Q4HRT PRN


   PRN Reason: Shortness Of Breath


Amlodipine Besylate (Norvasc)  10 mg PO QDAY [facility]


Benztropine Mesylate (Cogentin)  0.5 mg PO [facility]


   Last Admin: 06/01/18 22:43 Dose:  0.5 mg


Fluoxetine HCl (Prozac)  20 mg PO DAILY [facility]


   Last Admin: 06/01/18 09:44 Dose:  20 mg


Haloperidol Lactate (Haldol)  5 mg IM Q6HR PRN


   PRN Reason: Agitation


   Last Admin: 06/01/18 18:27 Dose:  5 mg


Hydralazine HCl (Apresoline)  10 mg IV Q4HR PRN


   PRN Reason: Hypertension


   Last Admin: 05/31/18 15:26 Dose:  10 mg


Hydrochlorothiazide (Hctz)  25 mg PO QDAY [facility]


   Last Admin: 06/01/18 09:44 Dose:  25 mg


Levofloxacin/Dextrose (Levaquin 750mg/150ml)  750 mg in 150 mls @ 100 mls/hr IV 

Q24HR [facility]; Protocol


Olanzapine (Zyprexa)  5 mg PO [facility]


   Last Admin: 06/01/18 22:43 Dose:  5 mg


Ondansetron HCl (Zofran)  4 mg IV Q8H PRN


   PRN Reason: Nausea And Vomiting


Phenytoin (Dilantin)  100 mg PO Q8HR [facility]


   Last Admin: 06/02/18 06:10 Dose:  100 mg


Sodium Chloride (Sodium Chloride Flush Syringe 10 Ml)  10 ml IV BID [facility]


   Last Admin: 06/01/18 22:46 Dose:  10 ml


Sodium Chloride (Sodium Chloride Flush Syringe 10 Ml)  10 ml IV PRN PRN


   PRN Reason: LINE FLUSH


Trazodone HCl (Desyrel)  100 mg PO QHS [facility]


   Last Admin: 06/01/18 22:43 Dose:  100 mg











Review of Systems


ROS unobtainable: due to mental status


Constitutional: other (as per HPI.)





Physical Examination





- Physical Exam


Narrative exam: 


General appearance: Alert in NAD, conversant 


Eyes: anicteric sclerae, moist conjunctivae; PERRLA 


HENT: Atraumatic; oropharynx clear with moist mucous membranes and no mucosal 

ulcerations/no oral thrush; normal hard and soft palate. Normal external ears.


Neck: Trachea midline; supple, no thyromegaly or lymphadenopathy 


Lungs: CTA, with normal respiratory effort and no intercostal retractions 


CV: RRR. S1,S2.


Abdomen: +BS. Soft. NT/ND. No masses or hepatosplenomegaly


Extremities: No c/c/e.


Skin: Normal temperature, turgor and texture; no rash, ulcers or subcutaneous 

nodules 


Neuro: Awake. Alert and oriented to name, place, date. No-focal deficits. As 

per RN talks to herself.


Lines: PIV








- Constitutional


Vitals: 


 Vital Signs











Temp Pulse Resp BP Pulse Ox


 


 98.3 F   110 H  17   138/91   98 


 


 06/02/18 07:44  06/02/18 07:44  06/02/18 07:44  06/02/18 07:44  06/02/18 07:44








 Temperature -Last 24 Hours











Temperature                    98.3 F


 


Temperature                    98.8 F


 


Temperature                    98.8 F


 


Temperature                    98.4 F


 


Temperature                    98.4 F

















Results





- Labs


CBC & Chem 7: 


 06/01/18 13:12





 06/02/18 03:10


Labs: 


 Abnormal lab results











  06/01/18 06/01/18 06/01/18 Range/Units





  13:12 13:12 13:12 


 


RDW   13.0 L   (13.2-15.2)  %


 


Sodium    136 L  (137-145)  mmol/L


 


Potassium    3.3 L  (3.6-5.0)  mmol/L


 


Chloride    97.2 L  ()  mmol/L


 


Glucose    106 H  ()  mg/dL


 


Total Creatine Kinase  1005 H    ()  units/L














  06/02/18 Range/Units





  03:10 


 


RDW   (13.2-15.2)  %


 


Sodium   (137-145)  mmol/L


 


Potassium   (3.6-5.0)  mmol/L


 


Chloride  97.6 L  ()  mmol/L


 


Glucose  120 H  ()  mg/dL


 


Total Creatine Kinase  794 H  ()  units/L














Assessment and Plan


Assessment: 


1) SIRS due to ESBL Escherichia coli bacteriuria versus UTI.  Urinalysis showed 

no significant pyuria and pt denies UTI symptoms prior to admission, but due to 

her psych history she might not be a reliable historian.


2) Acute leukocytosis.  Resolved.


3) Acute rhabdomyolysis. Improving.


4) Uncontrolled hypertension.


5) Acute encephalopathy. Multifactorial.  Due to psychosis, hypertensive 

encephalopathy, infection.


6) History of schizophrenia and bipolar disorder with celeste.








Plan:


-Stop levofloxacin.


-Start meropenem.


-Check renal ultrasound to rule out hydronephrosis and stones.


-Further recommendations as the case progresses





Thank you for your consultation, will follow up with you. 





Adrienne Padron MD


Infectious Diseases Specialist


[facility] ([facility]


 [phone]

## 2018-06-02 NOTE — PROGRESS NOTE
Assessment and Plan


Assessment and plan: 


/Acute Encephalopathy


Multifactorial, likely from acute psychosis vs sepsis vs uncontrolled BP


treat underlying cause





/Sepsis due to UTI


Initiated Sepsis Protocol: IV antibiotics, blood cultures, urinalysis, chest x 

ray, serial lactic acid, monitor uop q shift, IVF resuscitation, 








/Rhabdomyolysis


IVF resuscitation, repeat ck level in am, will consider IV bicarbonate if 

elevation in CK level








/ Bipolar 1 disorder with moderate celeste 


psychiatry consulted in ED, on 1013








/ Hypertension, uncontrolled


monitor bp q shift, on HcTZ and norvasc





/ DVT prophylaxis


scd to ble while in bed








Hospitalist Physical





- Constitutional


Vitals: 


 











Temp Pulse Resp BP Pulse Ox


 


 98.3 F   110 H  17   138/91   98 


 


 06/02/18 07:44  06/02/18 07:44  06/02/18 07:44  06/02/18 07:44  06/02/18 07:44











General appearance: Present: mild distress, obese, disheveled





Results





- Labs


CBC & Chem 7: 


 06/01/18 13:12





 06/02/18 03:10


Labs: 


 Laboratory Last Values











WBC  10.3 K/mm3 (4.5-11.0)   06/01/18  13:12    


 


RBC  4.67 M/mm3 (3.65-5.03)   06/01/18  13:12    


 


Hgb  13.3 gm/dl (10.1-14.3)   06/01/18  13:12    


 


Hct  39.7 % (30.3-42.9)   06/01/18  13:12    


 


MCV  85 fl (79-97)   06/01/18  13:12    


 


MCH  29 pg (28-32)   06/01/18  13:12    


 


MCHC  33 % (30-34)   06/01/18  13:12    


 


RDW  13.0 % (13.2-15.2)  L  06/01/18  13:12    


 


Plt Count  416 K/mm3 (140-440)   06/01/18  13:12    


 


Sodium  137 mmol/L (137-145)   06/02/18  03:10    


 


Potassium  3.6 mmol/L (3.6-5.0)   06/02/18  03:10    


 


Chloride  97.6 mmol/L ()  L  06/02/18  03:10    


 


Carbon Dioxide  27 mmol/L (22-30)   06/02/18  03:10    


 


Anion Gap  16 mmol/L  06/02/18  03:10    


 


BUN  9 mg/dL (7-17)   06/02/18  03:10    


 


Creatinine  0.7 mg/dL (0.7-1.2)   06/02/18  03:10    


 


Estimated GFR  > 60 ml/min  06/02/18  03:10    


 


BUN/Creatinine Ratio  13 %  06/02/18  03:10    


 


Glucose  120 mg/dL ()  H  06/02/18  03:10    


 


Lactic Acid  0.90 mmol/L (0.7-2.0)   05/30/18  20:01    


 


Calcium  9.1 mg/dL (8.4-10.2)   06/02/18  03:10    


 


Magnesium  2.20 mg/dL (1.7-2.3)   05/30/18  08:15    


 


Total Bilirubin  0.70 mg/dL (0.1-1.2)   05/30/18  08:15    


 


AST  49 units/L (5-40)  H  05/30/18  08:15    


 


ALT  27 units/L (7-56)   05/30/18  08:15    


 


Alkaline Phosphatase  104 units/L ()   05/30/18  08:15    


 


Total Creatine Kinase  794 units/L ()  H  06/02/18  03:10    


 


Total Protein  7.8 g/dL (6.3-8.2)   05/30/18  08:15    


 


Albumin  4.2 g/dL (3.9-5)   05/30/18  08:15    


 


Albumin/Globulin Ratio  1.2 %  05/30/18  08:15    


 


TSH  0.488 mlU/mL (0.270-4.200)   05/30/18  05:27    


 


Urine Color  Karen  (Yellow)   05/30/18  08:56    


 


Urine Turbidity  Hazy  (Clear)   05/30/18  08:56    


 


Urine pH  5.0  (5.0-7.0)   05/30/18  08:56    


 


Ur Specific Gravity  1.026  (1.003-1.030)   05/30/18  08:56    


 


Urine Protein  100 mg/dl mg/dL (Negative)   05/30/18  08:56    


 


Urine Glucose (UA)  Neg mg/dL (Negative)   05/30/18  08:56    


 


Urine Ketones  20 mg/dL (Negative)   05/30/18  08:56    


 


Urine Blood  Sm  (Negative)   05/30/18  08:56    


 


Urine Nitrite  Pos  (Negative)   05/30/18  08:56    


 


Urine Bilirubin  Neg  (Negative)   05/30/18  08:56    


 


Urine Urobilinogen  2.0 mg/dL (<2.0)   05/30/18  08:56    


 


Ur Leukocyte Esterase  Neg  (Negative)   05/30/18  08:56    


 


Urine WBC (Auto)  2.0 /HPF (0.0-6.0)   05/30/18  08:56    


 


Urine RBC (Auto)  5.0 /HPF (0.0-6.0)   05/30/18  08:56    


 


U Epithel Cells (Auto)  < 1.0 /HPF (0-13.0)   05/30/18  08:56    


 


Urine Bacteria (Auto)  4+ /HPF (Negative)   05/30/18  08:56    


 


Urine Mucus  3+ /HPF  05/30/18  08:56    


 


Salicylates  < 0.3 mg/dL (2.8-20.0)  L  05/30/18  05:27    


 


Urine Opiates Screen  Presumptive negative   05/30/18  08:56    


 


Urine Methadone Screen  Presumptive negative   05/30/18  08:56    


 


Acetaminophen  < 5.0 ug/mL (10.0-30.0)  L  05/30/18  05:27    


 


Ur Barbiturates Screen  Presumptive negative   05/30/18  08:56    


 


Phenytoin  0.8 ug/mL (10.0-20.0)  L  05/30/18  13:05    


 


Ur Phencyclidine Scrn  Presumptive negative   05/30/18  08:56    


 


Ur Amphetamines Screen  Presumptive negative   05/30/18  08:56    


 


U Benzodiazepines Scrn  Presumptive negative   05/30/18  08:56    


 


Urine Cocaine Screen  Presumptive negative   05/30/18  08:56    


 


U Marijuana (THC) Screen  Presumptive negative   05/30/18  08:56    


 


Drugs of Abuse Note  Disclamer   05/30/18  08:56    


 


Plasma/Serum Alcohol  < 0.01 % (0-0.07)   05/30/18  05:27

## 2018-06-03 LAB
BUN SERPL-MCNC: 11 MG/DL (ref 7–17)
BUN/CREAT SERPL: 14 %
CALCIUM SERPL-MCNC: 9.4 MG/DL (ref 8.4–10.2)
HEMOLYSIS INDEX: 5

## 2018-06-03 RX ADMIN — Medication SCH ML: at 02:28

## 2018-06-03 RX ADMIN — Medication SCH ML: at 14:32

## 2018-06-03 RX ADMIN — HYDROCHLOROTHIAZIDE SCH MG: 25 TABLET ORAL at 11:01

## 2018-06-03 RX ADMIN — MEROPENEM SCH MLS/HR: 500 INJECTION, POWDER, FOR SOLUTION INTRAVENOUS at 18:42

## 2018-06-03 RX ADMIN — AMLODIPINE BESYLATE SCH MG: 10 TABLET ORAL at 11:01

## 2018-06-03 RX ADMIN — MEROPENEM SCH MLS/HR: 500 INJECTION, POWDER, FOR SOLUTION INTRAVENOUS at 14:31

## 2018-06-03 RX ADMIN — TRAZODONE HYDROCHLORIDE SCH MG: 100 TABLET ORAL at 02:26

## 2018-06-03 RX ADMIN — PHENYTOIN SCH MG: 100 SUSPENSION ORAL at 14:31

## 2018-06-03 RX ADMIN — MEROPENEM SCH MLS/HR: 500 INJECTION, POWDER, FOR SOLUTION INTRAVENOUS at 02:24

## 2018-06-03 RX ADMIN — PHENYTOIN SCH MG: 100 SUSPENSION ORAL at 06:40

## 2018-06-03 RX ADMIN — MEROPENEM SCH MLS/HR: 500 INJECTION, POWDER, FOR SOLUTION INTRAVENOUS at 07:35

## 2018-06-03 RX ADMIN — PHENYTOIN SCH MG: 100 SUSPENSION ORAL at 02:25

## 2018-06-03 RX ADMIN — BENZTROPINE MESYLATE SCH MG: 0.5 TABLET ORAL at 02:25

## 2018-06-03 RX ADMIN — MEROPENEM SCH: 500 INJECTION, POWDER, FOR SOLUTION INTRAVENOUS at 03:33

## 2018-06-03 NOTE — PROGRESS NOTE
Assessment and Plan


Assessment and plan: 


--Bacterial vaginosis; managed with oral Flagyl, patient did not want vaginal 

tablets


Supportive care





--Sepsis due to UTI : ESBL+ ur cultures


Contact isolation, ID following, on meropenem


On Sepsis Protocol: IV antibiotics, blood cultures, urinalysis, chest x ray, 


serial lactic acid, monitor uop q shift, IVF resuscitation, 





--Acute Encephalopathy; resolved


Multifactorial, likely from acute psychosis vs sepsis vs uncontrolled BP


treat underlying cause





--Rhabdomyolysis: Trending down


IVF resuscitation, repeat ck level in am, will consider IV bicarbonate if 

elevation in CK level





-- Bipolar 1 disorder with moderate celeste 


psychiatry consulted in ED, on 1013





-- Hypertension, uncontrolled


monitor bp q shift, on HcTZ and norvasc





--DVT prophylaxis; SCD





1013 status


Closely monitor and adjust the medications as needed








History


Interval history: 


Patient seen and examined medical records reviewed


Patient feels that she has bacterial vaginosis


Has a history of vaginitis in the past improved with Flagyl


No other complaints


Vital signs reviewed








Hospitalist Physical





- Constitutional


Vitals: 


 











Temp Pulse Resp BP Pulse Ox


 


 98.7 F   120 H  22   140/90   97 


 


 06/03/18 10:23  06/03/18 10:23  06/03/18 10:23  06/03/18 11:01  06/03/18 10:23











General appearance: Present: no acute distress, well-nourished, obese





- EENT


Eyes: Present: PERRL, EOM intact





- Neck


Neck: Present: supple, normal ROM





- Respiratory


Respiratory effort: normal


Respiratory: bilateral: diminished, negative: rales, rhonchi, wheezing





- Cardiovascular


Rhythm: regular


Heart Sounds: Present: S1 & S2





- Extremities


Extremities: no ischemia, No edema





- Abdominal


General gastrointestinal: soft, non-tender, non-distended, normal bowel sounds





- Integumentary


Integumentary: Present: clear, warm





- Psychiatric


Psychiatric: appropriate mood/affect, cooperative





- Neurologic


Neurologic: CNII-XII intact, moves all extremities





Results





- Labs


CBC & Chem 7: 


 06/01/18 13:12





 06/03/18 15:26


Labs: 


 Laboratory Last Values











WBC  10.3 K/mm3 (4.5-11.0)   06/01/18  13:12    


 


RBC  4.67 M/mm3 (3.65-5.03)   06/01/18  13:12    


 


Hgb  13.3 gm/dl (10.1-14.3)   06/01/18  13:12    


 


Hct  39.7 % (30.3-42.9)   06/01/18  13:12    


 


MCV  85 fl (79-97)   06/01/18  13:12    


 


MCH  29 pg (28-32)   06/01/18  13:12    


 


MCHC  33 % (30-34)   06/01/18  13:12    


 


RDW  13.0 % (13.2-15.2)  L  06/01/18  13:12    


 


Plt Count  416 K/mm3 (140-440)   06/01/18  13:12    


 


Sodium  137 mmol/L (137-145)   06/02/18  03:10    


 


Potassium  3.6 mmol/L (3.6-5.0)   06/02/18  03:10    


 


Chloride  97.6 mmol/L ()  L  06/02/18  03:10    


 


Carbon Dioxide  27 mmol/L (22-30)   06/02/18  03:10    


 


Anion Gap  16 mmol/L  06/02/18  03:10    


 


BUN  9 mg/dL (7-17)   06/02/18  03:10    


 


Creatinine  0.7 mg/dL (0.7-1.2)   06/02/18  03:10    


 


Estimated GFR  > 60 ml/min  06/02/18  03:10    


 


BUN/Creatinine Ratio  13 %  06/02/18  03:10    


 


Glucose  120 mg/dL ()  H  06/02/18  03:10    


 


Lactic Acid  0.90 mmol/L (0.7-2.0)   05/30/18  20:01    


 


Calcium  9.1 mg/dL (8.4-10.2)   06/02/18  03:10    


 


Magnesium  2.20 mg/dL (1.7-2.3)   05/30/18  08:15    


 


Total Bilirubin  0.70 mg/dL (0.1-1.2)   05/30/18  08:15    


 


AST  49 units/L (5-40)  H  05/30/18  08:15    


 


ALT  27 units/L (7-56)   05/30/18  08:15    


 


Alkaline Phosphatase  104 units/L ()   05/30/18  08:15    


 


Total Creatine Kinase  794 units/L ()  H  06/02/18  03:10    


 


Total Protein  7.8 g/dL (6.3-8.2)   05/30/18  08:15    


 


Albumin  4.2 g/dL (3.9-5)   05/30/18  08:15    


 


Albumin/Globulin Ratio  1.2 %  05/30/18  08:15    


 


TSH  0.488 mlU/mL (0.270-4.200)   05/30/18  05:27    


 


Urine Color  Karen  (Yellow)   05/30/18  08:56    


 


Urine Turbidity  Hazy  (Clear)   05/30/18  08:56    


 


Urine pH  5.0  (5.0-7.0)   05/30/18  08:56    


 


Ur Specific Gravity  1.026  (1.003-1.030)   05/30/18  08:56    


 


Urine Protein  100 mg/dl mg/dL (Negative)   05/30/18  08:56    


 


Urine Glucose (UA)  Neg mg/dL (Negative)   05/30/18  08:56    


 


Urine Ketones  20 mg/dL (Negative)   05/30/18  08:56    


 


Urine Blood  Sm  (Negative)   05/30/18  08:56    


 


Urine Nitrite  Pos  (Negative)   05/30/18  08:56    


 


Urine Bilirubin  Neg  (Negative)   05/30/18  08:56    


 


Urine Urobilinogen  2.0 mg/dL (<2.0)   05/30/18  08:56    


 


Ur Leukocyte Esterase  Neg  (Negative)   05/30/18  08:56    


 


Urine WBC (Auto)  2.0 /HPF (0.0-6.0)   05/30/18  08:56    


 


Urine RBC (Auto)  5.0 /HPF (0.0-6.0)   05/30/18  08:56    


 


U Epithel Cells (Auto)  < 1.0 /HPF (0-13.0)   05/30/18  08:56    


 


Urine Bacteria (Auto)  4+ /HPF (Negative)   05/30/18  08:56    


 


Urine Mucus  3+ /HPF  05/30/18  08:56    


 


Salicylates  < 0.3 mg/dL (2.8-20.0)  L  05/30/18  05:27    


 


Urine Opiates Screen  Presumptive negative   05/30/18  08:56    


 


Urine Methadone Screen  Presumptive negative   05/30/18  08:56    


 


Acetaminophen  < 5.0 ug/mL (10.0-30.0)  L  05/30/18  05:27    


 


Ur Barbiturates Screen  Presumptive negative   05/30/18  08:56    


 


Phenytoin  0.8 ug/mL (10.0-20.0)  L  05/30/18  13:05    


 


Ur Phencyclidine Scrn  Presumptive negative   05/30/18  08:56    


 


Ur Amphetamines Screen  Presumptive negative   05/30/18  08:56    


 


U Benzodiazepines Scrn  Presumptive negative   05/30/18  08:56    


 


Urine Cocaine Screen  Presumptive negative   05/30/18  08:56    


 


U Marijuana (THC) Screen  Presumptive negative   05/30/18  08:56    


 


Drugs of Abuse Note  Disclamer   05/30/18  08:56    


 


Plasma/Serum Alcohol  < 0.01 % (0-0.07)   05/30/18  05:27

## 2018-06-03 NOTE — PROGRESS NOTE
Subjective





- Reason for Consult


Consult date: 06/03/18


Reason for consult: follow up





- Chief Complaint


Chief complaint: 


"I'm okay."





43 y.o. AA female presented to the ER for AMS. Today the patient is clam and 

cooperative during the assessment. She admitted to discontinuing her psych meds 

but does not say why.  She stated having a hx of Bipolar DO and takes Zyprexa, 

Prozac, and Trazodone at home. These were restarted. She states her zyprexa was 

a high dose. She continues to state she does not need psychiatric 

hospitalization. She denies SI/HI's and AVH's. Her nurse reports she was 

talking and laughing to herself today. She has not displayed aggressive behavior

, per staff. 





Mental Status Exam





- Vital signs


 Last Vital Signs











Temp  98.7 F   06/03/18 10:23


 


Pulse  120 H  06/03/18 10:23


 


Resp  22   06/03/18 10:23


 


BP  140/90   06/03/18 11:01


 


Pulse Ox  97   06/03/18 10:23














- Exam


Narrative exam: 





MSE:





 Appearance: calm


 Behavior: cooperative


 Speech: regular rate and tone


 Mood: "okay"


 Affect: congruent to mood


 Thought Process: limited in scope         


 Thought Content: denies SI/HI's and AVH's. She was responding to internal 

stimuli and laughing to herself intermittently


 Motor Activity: no abnormal movements


 Cognition: A/O x 3


 Insight: variable 


 Judgment: variable    






































Assessment and Plan





Impression: Hx of Bipolar DO per the patient. Today the patient was reported to 

be responding to internal stimuli. UDS negative. 





Medical: Sepsis





Recommendations/Plan: 


Continue 1013 and reassess in 24 hours to determine need for the ongoing hold.


Increase zyprexa to 10mg hs for mood and psychotic symptoms


Continue Cogentin 0.5 mg PO HS for EPS prevention. Continue Prozac 20 mg PO 

daily for depression and Trazodone 100 mg PO HS for sleep.

## 2018-06-04 RX ADMIN — Medication SCH ML: at 01:00

## 2018-06-04 RX ADMIN — HYDROCHLOROTHIAZIDE SCH MG: 25 TABLET ORAL at 11:20

## 2018-06-04 RX ADMIN — Medication SCH ML: at 11:21

## 2018-06-04 RX ADMIN — METRONIDAZOLE SCH MG: 500 TABLET ORAL at 21:30

## 2018-06-04 RX ADMIN — METRONIDAZOLE SCH MG: 500 TABLET ORAL at 06:56

## 2018-06-04 RX ADMIN — Medication SCH ML: at 21:32

## 2018-06-04 RX ADMIN — METRONIDAZOLE SCH MG: 500 TABLET ORAL at 00:54

## 2018-06-04 RX ADMIN — BENZTROPINE MESYLATE SCH MG: 0.5 TABLET ORAL at 21:30

## 2018-06-04 RX ADMIN — ENOXAPARIN SODIUM SCH MG: 100 INJECTION SUBCUTANEOUS at 21:30

## 2018-06-04 RX ADMIN — AMLODIPINE BESYLATE SCH MG: 10 TABLET ORAL at 11:21

## 2018-06-04 RX ADMIN — MEROPENEM SCH MLS/HR: 500 INJECTION, POWDER, FOR SOLUTION INTRAVENOUS at 06:56

## 2018-06-04 RX ADMIN — ENOXAPARIN SODIUM SCH MG: 100 INJECTION SUBCUTANEOUS at 00:55

## 2018-06-04 RX ADMIN — MEROPENEM SCH MLS/HR: 500 INJECTION, POWDER, FOR SOLUTION INTRAVENOUS at 00:53

## 2018-06-04 RX ADMIN — PHENYTOIN SCH MG: 100 SUSPENSION ORAL at 21:30

## 2018-06-04 RX ADMIN — TRAZODONE HYDROCHLORIDE SCH MG: 100 TABLET ORAL at 00:55

## 2018-06-04 RX ADMIN — TRAZODONE HYDROCHLORIDE SCH MG: 100 TABLET ORAL at 21:30

## 2018-06-04 RX ADMIN — MEROPENEM SCH MLS/HR: 500 INJECTION, POWDER, FOR SOLUTION INTRAVENOUS at 19:34

## 2018-06-04 RX ADMIN — MEROPENEM SCH MLS/HR: 500 INJECTION, POWDER, FOR SOLUTION INTRAVENOUS at 13:15

## 2018-06-04 RX ADMIN — METRONIDAZOLE SCH MG: 500 TABLET ORAL at 14:42

## 2018-06-04 RX ADMIN — PHENYTOIN SCH MG: 100 SUSPENSION ORAL at 00:55

## 2018-06-04 RX ADMIN — BENZTROPINE MESYLATE SCH MG: 0.5 TABLET ORAL at 00:55

## 2018-06-04 RX ADMIN — PHENYTOIN SCH MG: 100 SUSPENSION ORAL at 06:56

## 2018-06-04 RX ADMIN — PHENYTOIN SCH MG: 100 SUSPENSION ORAL at 14:42

## 2018-06-04 NOTE — PROGRESS NOTE
Assessment and Plan


Assessment and plan: 


--Bacterial vaginosis; managed with oral Flagyl, patient did not want vaginal 

tablets


Supportive care





--Sepsis due to UTI : ESBL+ ur cultures


Contact isolation, ID following, on meropenem


IV antibiotics, oral Levaquin on discharge per ID





--Acute Encephalopathy; resolved


Multifactorial, likely from acute psychosis 


treat underlying cause





--Rhabdomyolysis: Trending down


IVF resuscitation, improved





-- Bipolar 1 disorder with moderate celeste 


psychiatry following, on 1013





-- Hypertension, uncontrolled


monitor bp q shift, on HcTZ and norvasc





--Obesity; counseling done advised diet modification 


and exercise as tolerated, weight reduction when stable





--DVT prophylaxis; SCD





1013 status


Closely monitor and adjust the medications as needed


Possible discharge tomorrow on oral antibiotics





History


Interval history: 


Patient seen and examined medical records reviewed


Feels better no new complaints


Patient has ESBL UTI, on meropenem


Vital signs reviewed


1013 status








Hospitalist Physical





- Constitutional


Vitals: 


 











Temp Pulse Resp BP Pulse Ox


 


 98.8 F   107 H  18   168/87   98 


 


 06/04/18 17:00  06/04/18 17:00  06/04/18 17:00  06/04/18 17:00  06/04/18 17:00











General appearance: Present: no acute distress, well-nourished, obese





- EENT


Eyes: Present: PERRL, EOM intact





- Neck


Neck: Present: supple, normal ROM





- Respiratory


Respiratory effort: normal


Respiratory: bilateral: diminished, negative: rales, rhonchi, wheezing





- Cardiovascular


Rhythm: regular


Heart Sounds: Present: S1 & S2





- Extremities


Extremities: no ischemia, No edema





- Abdominal


General gastrointestinal: soft, non-tender, non-distended, normal bowel sounds





- Integumentary


Integumentary: Present: clear, warm





- Psychiatric


Psychiatric: appropriate mood/affect, cooperative





- Neurologic


Neurologic: CNII-XII intact, moves all extremities





Results





- Labs


CBC & Chem 7: 


 06/01/18 13:12





 06/03/18 15:26


Labs: 


 Laboratory Last Values











WBC  10.3 K/mm3 (4.5-11.0)   06/01/18  13:12    


 


RBC  4.67 M/mm3 (3.65-5.03)   06/01/18  13:12    


 


Hgb  13.3 gm/dl (10.1-14.3)   06/01/18  13:12    


 


Hct  39.7 % (30.3-42.9)   06/01/18  13:12    


 


MCV  85 fl (79-97)   06/01/18  13:12    


 


MCH  29 pg (28-32)   06/01/18  13:12    


 


MCHC  33 % (30-34)   06/01/18  13:12    


 


RDW  13.0 % (13.2-15.2)  L  06/01/18  13:12    


 


Plt Count  416 K/mm3 (140-440)   06/01/18  13:12    


 


Sodium  135 mmol/L (137-145)  L  06/03/18  15:26    


 


Potassium  3.7 mmol/L (3.6-5.0)   06/03/18  15:26    


 


Chloride  94.9 mmol/L ()  L  06/03/18  15:26    


 


Carbon Dioxide  27 mmol/L (22-30)   06/03/18  15:26    


 


Anion Gap  17 mmol/L  06/03/18  15:26    


 


BUN  11 mg/dL (7-17)   06/03/18  15:26    


 


Creatinine  0.8 mg/dL (0.7-1.2)   06/03/18  15:26    


 


Estimated GFR  > 60 ml/min  06/03/18  15:26    


 


BUN/Creatinine Ratio  14 %  06/03/18  15:26    


 


Glucose  109 mg/dL ()  H  06/03/18  15:26    


 


Lactic Acid  0.90 mmol/L (0.7-2.0)   05/30/18  20:01    


 


Calcium  9.4 mg/dL (8.4-10.2)   06/03/18  15:26    


 


Magnesium  2.20 mg/dL (1.7-2.3)   05/30/18  08:15    


 


Total Bilirubin  0.70 mg/dL (0.1-1.2)   05/30/18  08:15    


 


AST  49 units/L (5-40)  H  05/30/18  08:15    


 


ALT  27 units/L (7-56)   05/30/18  08:15    


 


Alkaline Phosphatase  104 units/L ()   05/30/18  08:15    


 


Total Creatine Kinase  362 units/L ()  H  06/03/18  15:26    


 


CK-MB (CK-2)  3.3 ng/mL (0.0-4.0)   06/03/18  15:26    


 


CK-MB (CK-2) Rel Index  0.9  (0-4)   06/03/18  15:26    


 


Total Protein  7.8 g/dL (6.3-8.2)   05/30/18  08:15    


 


Albumin  4.2 g/dL (3.9-5)   05/30/18  08:15    


 


Albumin/Globulin Ratio  1.2 %  05/30/18  08:15    


 


TSH  0.488 mlU/mL (0.270-4.200)   05/30/18  05:27    


 


Urine Color  Karen  (Yellow)   05/30/18  08:56    


 


Urine Turbidity  Hazy  (Clear)   05/30/18  08:56    


 


Urine pH  5.0  (5.0-7.0)   05/30/18  08:56    


 


Ur Specific Gravity  1.026  (1.003-1.030)   05/30/18  08:56    


 


Urine Protein  100 mg/dl mg/dL (Negative)   05/30/18  08:56    


 


Urine Glucose (UA)  Neg mg/dL (Negative)   05/30/18  08:56    


 


Urine Ketones  20 mg/dL (Negative)   05/30/18  08:56    


 


Urine Blood  Sm  (Negative)   05/30/18  08:56    


 


Urine Nitrite  Pos  (Negative)   05/30/18  08:56    


 


Urine Bilirubin  Neg  (Negative)   05/30/18  08:56    


 


Urine Urobilinogen  2.0 mg/dL (<2.0)   05/30/18  08:56    


 


Ur Leukocyte Esterase  Neg  (Negative)   05/30/18  08:56    


 


Urine WBC (Auto)  2.0 /HPF (0.0-6.0)   05/30/18  08:56    


 


Urine RBC (Auto)  5.0 /HPF (0.0-6.0)   05/30/18  08:56    


 


U Epithel Cells (Auto)  < 1.0 /HPF (0-13.0)   05/30/18  08:56    


 


Urine Bacteria (Auto)  4+ /HPF (Negative)   05/30/18  08:56    


 


Urine Mucus  3+ /HPF  05/30/18  08:56    


 


Salicylates  < 0.3 mg/dL (2.8-20.0)  L  05/30/18  05:27    


 


Urine Opiates Screen  Presumptive negative   05/30/18  08:56    


 


Urine Methadone Screen  Presumptive negative   05/30/18  08:56    


 


Acetaminophen  < 5.0 ug/mL (10.0-30.0)  L  05/30/18  05:27    


 


Ur Barbiturates Screen  Presumptive negative   05/30/18  08:56    


 


Phenytoin  0.8 ug/mL (10.0-20.0)  L  05/30/18  13:05    


 


Ur Phencyclidine Scrn  Presumptive negative   05/30/18  08:56    


 


Ur Amphetamines Screen  Presumptive negative   05/30/18  08:56    


 


U Benzodiazepines Scrn  Presumptive negative   05/30/18  08:56    


 


Urine Cocaine Screen  Presumptive negative   05/30/18  08:56    


 


U Marijuana (THC) Screen  Presumptive negative   05/30/18  08:56    


 


Drugs of Abuse Note  Disclamer   05/30/18  08:56    


 


Plasma/Serum Alcohol  < 0.01 % (0-0.07)   05/30/18  05:27

## 2018-06-04 NOTE — PROGRESS NOTE
Subjective





- Reason for Consult


Consult date: 06/04/18


Reason for consult: Psychiatry Follow-up





- Chief Complaint


Chief complaint: 


"Call my "





43 y.o. AA female presented to the ER for AMS. Today the patient is clam and 

cooperative during the assessment. She stated only sleep for 1 to 2 hours the 

past few nights. She stated that she will stay on her medications once 

discharged. Per the staff, the patient has been talking to herself 

intermittently. She denies SI/HI's and AVH's. She denies any side effects of 

her medications.  





Mental Status Exam





- Vital signs


 Last Vital Signs











Temp  99.3 F   06/04/18 08:41


 


Pulse  96 H  06/04/18 08:41


 


Resp  20   06/04/18 08:41


 


BP  148/90   06/04/18 08:41


 


Pulse Ox  98   06/04/18 08:41














- Exam


Narrative exam: 


MSE:





 Appearance: calm, cooperative


 Behavior: regular eye contact


 Speech: regular rate and tone


 Mood: "okay"


 Affect: congruent to mood


 Thought Process: circumstantial       


 Thought Content: denies SI/HI's and AVH's  


 Motor Activity: lying in bed


 Cognition: A/O x 3


 Insight: variable 


 Judgment: variable    
































  





Assessment and Plan


Impression: Hx of Bipolar DO per the patient. Today the patient is calm and 

cooperative during the assessment. UDS negative. 





Medical: Sepsis





Recommendations/Plan: Evaluate 1013 in 24 hours and gather collateral 

information to determine proper dispo. Continue Zyprexa 10 mg PO HS for mood, 

Cogentin 0.5 mg PO HS for EPS prevention, Prozac 20 mg PO daily for depression, 

and Trazodone 100 mg PO HS for sleep. Start Klonopin 0.5 mg PO HS x 2 days for 

sleep. Discussed possible metabolic side effects of Zyprexa with patient. 

Discussed possible suicidality/medication induced celeste with patient reference 

Trazodone/Prozac.

## 2018-06-04 NOTE — PROGRESS NOTE
Assessment and Plan


Assessment: 


1) SIRS: still low grade fever and tachycardia; unclear etiology ? due to ESBL 

Escherichia coli bacteriuria versus UTI.  Urinalysis showed no significant 

pyuria and pt denies UTI symptoms prior to admission, but due to her psych 

history she might not be a reliable historian.


   -renal US showed mild right kidney hydro


2) Acute leukocytosis.  Resolved.


3) Acute rhabdomyolysis. Improving.


4) Uncontrolled hypertension.


5) Acute encephalopathy. Multifactorial.  Due to psychosis, hypertensive 

encephalopathy, infection.


6) History of schizophrenia and bipolar disorder with celeste.








Plan:


-continue meropenem for now


-monitor fever/tachycardia


-upon discharge will do levaquin 750 mg PO qday total 5 days until 6/6/18


 


Thank you for your consultation, will follow up with you. 





Adrienne Padron MD


Infectious Diseases Specialist


[facility] ([facility])


M [phone]








Subjective


Date of service: 06/04/18


Principal diagnosis: SIRS


Interval history: 





Microbiology:





Blood cultures:


5/30 NGTD





Urine cultures:


5/30 ESBL E coli (10-100k cfu/ml)








Current Antimicrobials:


Levofloxacin 6/2-





Previous Antimicrobials:


Ceftriaxone 5/30-6/1








Objective





- Exam


Narrative Exam: 


General appearance: Alert in NAD, conversant 


Eyes: anicteric sclerae, moist conjunctivae; PERRLA 


HENT: Atraumatic; oropharynx clear with moist mucous membranes and no mucosal 

ulcerations/no oral thrush; normal hard and soft palate. Normal external ears.


Neck: Trachea midline; supple, no thyromegaly or lymphadenopathy 


Lungs: CTA, with normal respiratory effort and no intercostal retractions 


CV: RRR. S1,S2.


Abdomen: +BS. Soft. NT/ND. No masses or hepatosplenomegaly


Extremities: No c/c/e.


Skin: Normal temperature, turgor and texture; no rash, ulcers or subcutaneous 

nodules 


Neuro: Awake. Alert and oriented to name, place, date. No-focal deficits. As 

per RN talks to herself.


Lines: PIV











- Constitutional


Vitals: 


 Vital Signs











Temp Pulse Resp BP Pulse Ox


 


 99.3 F   96 H  20   148/90   98 


 


 06/04/18 08:41  06/04/18 08:41  06/04/18 08:41  06/04/18 08:41  06/04/18 08:41








 Temperature -Last 24 Hours











Temperature                    99.3 F


 


Temperature                    98.3 F


 


Temperature                    99.7 F

















- Labs


CBC & Chem 7: 


 06/01/18 13:12





 06/03/18 15:26


Labs: 


 Abnormal lab results











  06/03/18 Range/Units





  15:26 


 


Sodium  135 L  (137-145)  mmol/L


 


Chloride  94.9 L  ()  mmol/L


 


Glucose  109 H  ()  mg/dL


 


Total Creatine Kinase  362 H  ()  units/L

## 2018-06-05 VITALS — SYSTOLIC BLOOD PRESSURE: 138 MMHG | DIASTOLIC BLOOD PRESSURE: 78 MMHG

## 2018-06-05 RX ADMIN — AMLODIPINE BESYLATE SCH MG: 10 TABLET ORAL at 11:19

## 2018-06-05 RX ADMIN — METRONIDAZOLE SCH MG: 500 TABLET ORAL at 06:07

## 2018-06-05 RX ADMIN — MEROPENEM SCH MLS/HR: 500 INJECTION, POWDER, FOR SOLUTION INTRAVENOUS at 00:17

## 2018-06-05 RX ADMIN — METRONIDAZOLE SCH MG: 500 TABLET ORAL at 16:01

## 2018-06-05 RX ADMIN — PHENYTOIN SCH MG: 100 SUSPENSION ORAL at 16:01

## 2018-06-05 RX ADMIN — Medication SCH ML: at 11:19

## 2018-06-05 RX ADMIN — MEROPENEM SCH MLS/HR: 500 INJECTION, POWDER, FOR SOLUTION INTRAVENOUS at 06:07

## 2018-06-05 RX ADMIN — PHENYTOIN SCH MG: 100 SUSPENSION ORAL at 06:07

## 2018-06-05 RX ADMIN — HYDROCHLOROTHIAZIDE SCH MG: 25 TABLET ORAL at 11:19

## 2018-06-05 NOTE — PROGRESS NOTE
Subjective





- Reason for Consult


Consult date: 18


Reason for consult: Psychiatry Follow-up





- Chief Complaint


Chief complaint: 


"How are you"





43 y.o. AA female presented to the ER for AMS. Today the patient is clam and 

cooperative during the assessment. Per collateral information from her  

Mr Concepcion Peacock at [phone], he stated that his wife wasn't taking her 

medication for several days prior to her admission to [facility]. He stated that she 

talks to herself "occasionally." He stated that she is "stable" when she is 

compliant with her medications and seeing her psychiatrist monthly. He stated 

that she is seen by Dr Costa for outpatient psy services. He stated that his 

wife is at her baseline currently after several visits with her at the 

hospital. He stated that he feel safe for her to return home once discharged. 

Per the staff, no behavioral disturbance overnight. She stated sleeping 6 hours 

last night and denies any side effects of her medications. She denies SI/HI's 

and AVH's. 





Mental Status Exam





- Vital signs


 Last Vital Signs











Temp  98.6 F   18 08:37


 


Pulse  93 H  18 08:37


 


Resp  17   18 08:37


 


BP  138/78   18 08:37


 


Pulse Ox  97   18 08:37














- Exam


Narrative exam: 


MSE:





 Appearance: calm, cooperative


 Behavior: regular eye contact


 Speech: regular rate and tone


 Mood: "okay"


 Affect: congruent to mood


 Thought Process: linear        


 Thought Content: denies SI/HI's and AVH's  


 Motor Activity: ambulatory 


 Cognition: A/O x 3


 Insight: appropriate   


 Judgment: appropriate    
































  

















Assessment and Plan


Impression: Hx of Bipolar DO per the patient. Today the patient is calm and 

cooperative during the assessment. UDS negative. This is the patient's baseline.





Medical: Sepsis





Recommendations/Plan: Rescind 1013. Continue Zyprexa 10 mg PO HS for mood, 

Cogentin 0.5 mg PO HS for EPS prevention, Prozac 20 mg PO daily for depression, 

and Trazodone 100 mg PO HS for sleep. The order for Klonopin 0.5 mg PO HS for 

sleep will  tomorrow if patient is still in the hospital. Discussed 

possible metabolic side effects of Zyprexa with patient. Discussed possible 

suicidality/medication induced celeset with patient reference Trazodone/Prozac. 

The patient can follow up with Dr Costa her psychiatrist for outpatient psy 

services.

## 2018-06-05 NOTE — DISCHARGE SUMMARY
Providers





- Providers


Date of Admission: 


05/30/18 12:54





Date of discharge: 06/05/18


Attending physician: 


AMY J KOCHERLA





 





05/30/18 12:51


Consult to Physician [CONS] Routine 


   Comment: 


   Consulting Provider: LYNDSEY THAKKAR


   Physician Instructions: schizophrenia


   Reason For Exam: schizophrenia





06/02/18 08:32


Consult to Physician [CONS] Routine 


   Comment: 


   Consulting Provider: BAN MERCHANT


   Physician Instructions: 


   Reason For Exam: UTI/ESBL











Primary care physician: 


PRIMARY CARE MD








Hospitalization


Reason for admission: altered level of consciousness/suicidal attempt


Condition: Stable


Pertinent studies: 


CT head; within normal limits


Chest x-ray; no acute abnormalities noted


Imaging ultrasound; suspected mild hydronephrosis of the right kidney





Hospital course: 


43-year-old morbidly obese female patient with significant history of 

schizophrenia and bipolar was admitted 


through emergency room with altered level of consciousness and suicide attempt


Patient was initially evaluated admitted to the hospital evaluated by 

psychiatric


Placed on 1013 status, symptomatically managed


Patient also had urinary tract infection, cultures were positive for ESBL


Seen by ID, managed with appropriate antibiotics


Patient was placed on contact isolation as well as 1013 status with symptoms 

that


Psych has followed the patient throughout the hospital stay


Patient also reports history of bacterial vaginosis, not treated before, 

started on metronidazole, 


symptoms significantly improved


Patient is medically stable for discharge, psych has evaluated the patient


Discontinued 1013 status, and cleared for discharge home


And follow-up with them per schedule


Today she is comfortable in no new complaints


Vital signs stable


Physical examination prior to discharge is unremarkable


Patient counseled diet modification and exercise as tolerated and weight 

reduction


Patient also counseled to be compliant with medications and diet


Patient is hemodynamically and clinically stable at discharge





During evaluation patient had renal ultrasound, there was a small suspicion of 

hydronephrosis, 


patient's renal function was stable,Advised to have follow-up renal ultrasound 

in 1-2 weeks to be evaluated by PMD.





Final diagnosis;


--Metabolic encephalopathy


--Sepsis secondary to UTI/ESBL


--Suicidal ideation


--Bipolar disorder


--Rhabdomyolysis resolved


--Hypertension


--Bacterial vaginosis


--Obesity


--Schizophrenia


Disposition: DC-01 TO HOME OR SELFCARE


Time spent for discharge: 32 min





Core Measure Documentation





- Palliative Care


Palliative Care/ Comfort Measures: Not Applicable





- Core Measures


Any of the following diagnoses?: none





Exam





- Constitutional


Vitals: 


 











Temp Pulse Resp BP Pulse Ox


 


 98.6 F   93 H  17   138/78   97 


 


 06/05/18 08:37  06/05/18 08:37  06/05/18 08:37  06/05/18 08:37  06/05/18 08:37











General appearance: Present: no acute distress, well-nourished, obese





- EENT


Eyes: Present: PERRL, EOM intact





- Neck


Neck: Present: supple, normal ROM





- Respiratory


Respiratory effort: normal


Respiratory: negative: rales, rhonchi, wheezing





- Cardiovascular


Rhythm: regular


Heart Sounds: Present: S1 & S2





- Extremities


Extremities: no ischemia, No edema


Peripheral Pulses: within normal limits





- Abdominal


General gastrointestinal: Present: soft, non-tender, non-distended, normal 

bowel sounds





- Integumentary


Integumentary: Present: clear, warm





- Musculoskeletal


Musculoskeletal: strength equal bilaterally, generalized weakness





- Psychiatric


Psychiatric: appropriate mood/affect, cooperative





- Neurologic


Neurologic: CNII-XII intact, moves all extremities





Plan


Activity: no restrictions


Diet: low salt


Additional Instructions: Follow private psychiatrist Dr. Costa in 1 week.  

Advised to see private GYN in 1 week.  Check Renal ultrasound in 1-2 weeks to 

be followed by PMD


Follow up with: 


JADA MATTHEWS MD [Staff Physician] - 7 Days


PRIMARY CARE,MD [Primary Care Provider] - 3-5 Days


SCOTT NGUYEN MD [Staff Physician] - 7 Days


Prescriptions: 


amLODIPine [Norvasc] 10 mg PO QDAY #30 tablet


Fluconazole [Diflucan TAB] 150 mg PO ONCE #1 tablet


Levofloxacin [Levaquin] 750 mg PO QDAY #2 tablet


metroNIDAZOLE [Flagyl TAB] 500 mg PO Q8HR #21 tablet

## 2018-06-05 NOTE — PROGRESS NOTE
Assessment and Plan


Assessment: 


1) SIRS: still low grade fever and tachycardia; unclear etiology ? due to ESBL 

Escherichia coli bacteriuria versus UTI.  Urinalysis showed no significant 

pyuria and pt denies UTI symptoms prior to admission, but due to her psych 

history she might not be a reliable historian.


   -renal US showed mild right kidney hydro


2) Acute leukocytosis.  Resolved.


3) Acute rhabdomyolysis. Improving.


4) Uncontrolled hypertension.


5) Acute encephalopathy. Multifactorial.  Due to psychosis, hypertensive 

encephalopathy, infection.


6) History of schizophrenia and bipolar disorder with celeste.








Plan:


-stop meropenem D4


-monitor fever/tachycardia


-start levaquin 750 mg PO qday total 5 days until 6/6/18





I am signing off 


 


Thank you for your consultation, will follow up with you. 





Mabel Sifuentes MD


Infectious Diseases Specialist


[facility] ([facility])











Subjective


Date of service: 06/05/18


Principal diagnosis: SIRS


Interval history: 


Feels better, happy, "dancing", wants to go home. No fever. 





Microbiology:





Blood cultures:


5/30 Neg





Urine cultures:


5/30 ESBL E coli (10-100k cfu/ml)








Current Antimicrobials:


meropenem 6/2





Previous Antimicrobials:


Ceftriaxone 5/30-6/1


Levofloxacin 6/2-





Objective





- Exam


Narrative Exam: 


General appearance: Alert in NAD, conversant dancing and singing on her room out

-of-bed


Eyes: anicteric sclerae, moist conjunctivae; PERRLA 


HENT: Atraumatic; oropharynx clear with moist mucous membranes and no mucosal 

ulcerations/no oral thrush; normal hard and soft palate. Normal external ears.


Neck: Trachea midline; supple, no thyromegaly or lymphadenopathy 


Lungs: CTA, with normal respiratory effort and no intercostal retractions 


CV: RRR. S1,S2.


Abdomen: +BS. Soft. NT/ND. No masses or hepatosplenomegaly


Extremities: No c/c/e.


Skin: Normal temperature, turgor and texture; no rash, ulcers or subcutaneous 

nodules 


Neuro: Awake. Alert and oriented to name, place, date. No-focal deficits. As 

per RN talks to herself.


Lines: PIV











- Constitutional


Vitals: 


 Vital Signs











Temp Pulse Resp BP Pulse Ox


 


 98.7 F   87   20   126/88   92 


 


 06/05/18 06:49  06/05/18 06:49  06/05/18 06:49  06/05/18 06:49  06/05/18 06:49








 Temperature -Last 24 Hours











Temperature                    98.7 F


 


Temperature                    98.5 F


 


Temperature                    98.8 F

















- Labs


CBC & Chem 7: 


 06/01/18 13:12





 06/03/18 15:26

## 2019-08-04 ENCOUNTER — HOSPITAL ENCOUNTER (EMERGENCY)
Dept: HOSPITAL 5 - ED | Age: 44
LOS: 2 days | Discharge: PSYCHIATRIC OR SUBSTANCE USE FACILITY | End: 2019-08-06
Payer: MEDICARE

## 2019-08-04 DIAGNOSIS — Z79.899: ICD-10-CM

## 2019-08-04 DIAGNOSIS — Z90.710: ICD-10-CM

## 2019-08-04 DIAGNOSIS — F29: Primary | ICD-10-CM

## 2019-08-04 DIAGNOSIS — Z59.0: ICD-10-CM

## 2019-08-04 DIAGNOSIS — F20.9: ICD-10-CM

## 2019-08-04 DIAGNOSIS — F31.9: ICD-10-CM

## 2019-08-04 DIAGNOSIS — L97.509: ICD-10-CM

## 2019-08-04 DIAGNOSIS — I10: ICD-10-CM

## 2019-08-04 LAB
BACTERIA #/AREA URNS HPF: (no result) /HPF
BASOPHILS # (AUTO): 0 K/MM3 (ref 0–0.1)
BASOPHILS NFR BLD AUTO: 0.5 % (ref 0–1.8)
BENZODIAZEPINES SCREEN,URINE: (no result)
BILIRUB UR QL STRIP: (no result)
BLOOD UR QL VISUAL: (no result)
BUN SERPL-MCNC: 5 MG/DL (ref 7–17)
BUN/CREAT SERPL: 6 %
CALCIUM SERPL-MCNC: 9.4 MG/DL (ref 8.4–10.2)
EOSINOPHIL # BLD AUTO: 0.1 K/MM3 (ref 0–0.4)
EOSINOPHIL NFR BLD AUTO: 0.8 % (ref 0–4.3)
HCT VFR BLD CALC: 42.2 % (ref 30.3–42.9)
HEMOLYSIS INDEX: 6
HGB BLD-MCNC: 14 GM/DL (ref 10.1–14.3)
LYMPHOCYTES # BLD AUTO: 1.9 K/MM3 (ref 1.2–5.4)
LYMPHOCYTES NFR BLD AUTO: 25 % (ref 13.4–35)
MCHC RBC AUTO-ENTMCNC: 33 % (ref 30–34)
MCV RBC AUTO: 87 FL (ref 79–97)
METHADONE SCREEN,URINE: (no result)
MONOCYTES # (AUTO): 0.7 K/MM3 (ref 0–0.8)
MONOCYTES % (AUTO): 8.9 % (ref 0–7.3)
MUCOUS THREADS #/AREA URNS HPF: (no result) /HPF
OPIATE SCREEN,URINE: (no result)
PH UR STRIP: 5 [PH] (ref 5–7)
PLATELET # BLD: 498 K/MM3 (ref 140–440)
RBC # BLD AUTO: 4.85 M/MM3 (ref 3.65–5.03)
RBC #/AREA URNS HPF: 3 /HPF (ref 0–6)
UROBILINOGEN UR-MCNC: 2 MG/DL (ref ?–2)
WBC #/AREA URNS HPF: 8 /HPF (ref 0–6)

## 2019-08-04 PROCEDURE — 81001 URINALYSIS AUTO W/SCOPE: CPT

## 2019-08-04 PROCEDURE — 83036 HEMOGLOBIN GLYCOSYLATED A1C: CPT

## 2019-08-04 PROCEDURE — 36415 COLL VENOUS BLD VENIPUNCTURE: CPT

## 2019-08-04 PROCEDURE — 84703 CHORIONIC GONADOTROPIN ASSAY: CPT

## 2019-08-04 PROCEDURE — 80307 DRUG TEST PRSMV CHEM ANLYZR: CPT

## 2019-08-04 PROCEDURE — 80320 DRUG SCREEN QUANTALCOHOLS: CPT

## 2019-08-04 PROCEDURE — 96372 THER/PROPH/DIAG INJ SC/IM: CPT

## 2019-08-04 PROCEDURE — G0480 DRUG TEST DEF 1-7 CLASSES: HCPCS

## 2019-08-04 PROCEDURE — 85025 COMPLETE CBC W/AUTO DIFF WBC: CPT

## 2019-08-04 PROCEDURE — 80061 LIPID PANEL: CPT

## 2019-08-04 PROCEDURE — 80048 BASIC METABOLIC PNL TOTAL CA: CPT

## 2019-08-04 PROCEDURE — 99285 EMERGENCY DEPT VISIT HI MDM: CPT

## 2019-08-04 RX ADMIN — AMLODIPINE BESYLATE SCH MG: 5 TABLET ORAL at 22:43

## 2019-08-04 RX ADMIN — ZIPRASIDONE MESYLATE PRN MG: 20 INJECTION, POWDER, LYOPHILIZED, FOR SOLUTION INTRAMUSCULAR at 20:11

## 2019-08-04 SDOH — ECONOMIC STABILITY - HOUSING INSECURITY: HOMELESSNESS: Z59.0

## 2019-08-04 NOTE — EMERGENCY DEPARTMENT REPORT
ED Psych HPI





- General


Chief Complaint: Psych


Stated Complaint: SUICIDAL THOUGHTS


Time Seen by Provider: 08/04/19 17:22


Source: patient


Mode of arrival: Ambulatory





- History of Present Illness


Initial Comments: 





Patient is 44 years old female with history of schizophrenia.  Patient presented

to the ER stating that she does not want to live anymore.  Patient kept 

repeating the same phrase "I should've gone to a hotel".  Patient is really 

disorganized.  Patient denied homicidal ideation.  She also denied any auditory 

or visual hallucination.


MD Complaint: suicidal ideation, feels depressed


Associated Psychiatric Symptoms: depression, suicidal ideation, racing thoughts


History of same: Yes


Quality: constant


Associated Symptoms: denies other symptoms


Treatments Prior to Arrival: none


If Self Harm: admits thoughts of





- Related Data


                                Home Medications











 Medication  Instructions  Recorded  Confirmed  Last Taken


 


Benztropine [Cogentin] 2 mg PO DAILY 02/20/17 05/30/18 Unknown


 


FLUoxetine HCL [Prozac] 20 mg PO DAILY 05/30/18 05/30/18 Unknown


 


OLANZapine 30 mg PO HS 05/30/18 05/30/18 Unknown


 


traZODone [Desyrel] 100 mg PO QHS 05/30/18 05/30/18 Unknown








                                  Previous Rx's











 Medication  Instructions  Recorded  Last Taken  Type


 


Phenytoin (25 mg/ml) [Dilantin] 100 mg PO Q8HR #90 tab 05/06/17 Unknown Rx


 


Fluconazole [Diflucan TAB] 150 mg PO ONCE #1 tablet 06/05/18 Unknown Rx


 


levoFLOXacin [Levaquin] 750 mg PO QDAY #2 tablet 06/05/18 Unknown Rx


 


metroNIDAZOLE [Flagyl TAB] 500 mg PO Q8HR #21 tablet 06/05/18 Unknown Rx


 


Ciprofloxacin HCl [Cipro] 500 mg PO BID 5 Days #10 tablet 12/07/18 Unknown Rx


 


amLODIPine [Norvasc] 10 mg PO QDAY #30 tablet 12/07/18 Unknown Rx


 


hydroCHLOROthiazide [HCTZ] 25 mg PO QDAY #30 tablet 12/07/18 Unknown Rx











                                    Allergies











Allergy/AdvReac Type Severity Reaction Status Date / Time


 


No Known Allergies Allergy   Verified 05/01/17 05:08














ED Review of Systems


ROS: 


Stated complaint: SUICIDAL THOUGHTS


Other details as noted in HPI





Comment: All other systems reviewed and negative


Constitutional: denies: chills, diaphoresis


Respiratory: denies: cough, shortness of breath, SOB with exertion, SOB at rest


Cardiovascular: denies: chest pain


Gastrointestinal: denies: abdominal pain, nausea


Musculoskeletal: denies: back pain


Neurological: denies: headache, weakness, numbness, paresthesias, abnormal gait


Psychiatric: anxiety, depression, suicidal thoughts.  denies: auditory 

hallucinations, visual hallucinations, homicidal thoughts





ED Past Medical Hx





- Past Medical History


Hx Hypertension: Yes


Hx Congestive Heart Failure: No


Hx Diabetes: No


Hx Psychiatric Treatment: Yes (bipolar, schziophrenia)


Hx Asthma: No


Hx COPD: No





- Surgical History


Additional Surgical History: Hysterectomy





- Social History


Smoking Status: Unknown if ever smoked


Substance Use Type: None





- Medications


Home Medications: 


                                Home Medications











 Medication  Instructions  Recorded  Confirmed  Last Taken  Type


 


Benztropine [Cogentin] 2 mg PO DAILY 02/20/17 05/30/18 Unknown History


 


Phenytoin (25 mg/ml) [Dilantin] 100 mg PO Q8HR #90 tab 05/06/17 05/30/18 Unknown

 Rx


 


FLUoxetine HCL [Prozac] 20 mg PO DAILY 05/30/18 05/30/18 Unknown History


 


OLANZapine 30 mg PO HS 05/30/18 05/30/18 Unknown History


 


traZODone [Desyrel] 100 mg PO QHS 05/30/18 05/30/18 Unknown History


 


Fluconazole [Diflucan TAB] 150 mg PO ONCE #1 tablet 06/05/18  Unknown Rx


 


levoFLOXacin [Levaquin] 750 mg PO QDAY #2 tablet 06/05/18  Unknown Rx


 


metroNIDAZOLE [Flagyl TAB] 500 mg PO Q8HR #21 tablet 06/05/18  Unknown Rx


 


Ciprofloxacin HCl [Cipro] 500 mg PO BID 5 Days #10 tablet 12/07/18  Unknown Rx


 


amLODIPine [Norvasc] 10 mg PO QDAY #30 tablet 12/07/18  Unknown Rx


 


hydroCHLOROthiazide [HCTZ] 25 mg PO QDAY #30 tablet 12/07/18  Unknown Rx














ED Physical Exam





- General


Limitations: No Limitations


General appearance: alert, in no apparent distress, anxious





- Head


Head exam: Present: atraumatic, normocephalic, normal inspection





- Eye


Eye exam: Present: normal appearance, PERRL





- ENT


ENT exam: Present: normal exam, normal orophraynx, mucous membranes moist





- Neck


Neck exam: Present: normal inspection, full ROM.  Absent: tenderness, 

meningismus, lymphadenopathy, thyromegaly





- Respiratory


Respiratory exam: Present: normal lung sounds bilaterally





- Cardiovascular


Cardiovascular Exam: Present: regular rate, normal rhythm, normal heart sounds





- GI/Abdominal


GI/Abdominal exam: Present: soft, normal bowel sounds.  Absent: distended, 

tenderness, guarding, rebound, rigid, organomegaly, mass, bruit, pulsatile mass,

 hernia





- Extremities Exam


Extremities exam: Present: normal inspection, full ROM, normal capillary refill.

  Absent: pedal edema, calf tenderness





- Back Exam


Back exam: Present: normal inspection, full ROM.  Absent: CVA tenderness (R), 

CVA tenderness (L), muscle spasm, paraspinal tenderness, vertebral tenderness, 

rash noted





- Neurological Exam


Neurological exam: Present: alert, oriented X3, CN II-XII intact, normal gait, 

reflexes normal





- Psychiatric


Psychiatric exam: Present: depressed, anxious, suicidal ideation.  Absent: 

agitated, flat affect, manic, homicidal ideation





- Skin


Skin exam: Present: warm, intact, normal color





ED Course





                                   Vital Signs











  08/04/19





  17:31


 


Temperature 98.2 F


 


Pulse Rate 112 H


 


Respiratory 18





Rate 


 


Blood Pressure 152/99


 


O2 Sat by Pulse 100





Oximetry 














ED Medical Decision Making





- Lab Data


Result diagrams: 


                                 08/04/19 17:39





                                 08/04/19 17:39


Critical care attestation.: 


If time is entered above; I have spent that time in minutes in the direct care 

of this critically ill patient, excluding procedure time.








ED Disposition


Clinical Impression: 


 Suicidal behavior, Psychosis





Disposition: DC/TX-65 PSY HOSP/PSY UNIT


Is pt being admited?: No


Condition: Stable

## 2019-08-04 NOTE — EVENT NOTE
ED Screening Note


Date of service: 08/04/19


Time: 17:22


ED Screening Note: 





This is a 44 y.o. F. that presents to the ER with suicidal thoughts.  





Denies HI





PMH of depression and insomnia





This initial assessment/diagnostic orders/clinical plan/treatment(s) is/are 

subject to change based on patients health status, clinical progression and re-

assessment by fellow clinical providers in the ED. Further treatment and workup 

at subsequent clinical providers discretion. Patient/guardian urged not to elope

from the ED as their condition may be serious if not clinically assessed and 

managed. 





Initial orders include: 





Labs

## 2019-08-05 VITALS — DIASTOLIC BLOOD PRESSURE: 98 MMHG | SYSTOLIC BLOOD PRESSURE: 144 MMHG

## 2019-08-05 LAB — HDLC SERPL-MCNC: 54 MG/DL (ref 40–59)

## 2019-08-05 RX ADMIN — ZIPRASIDONE MESYLATE PRN MG: 20 INJECTION, POWDER, LYOPHILIZED, FOR SOLUTION INTRAMUSCULAR at 08:57

## 2019-08-05 RX ADMIN — AMLODIPINE BESYLATE SCH: 5 TABLET ORAL at 11:23

## 2019-08-05 NOTE — CONSULTATION
History of Present Illness





- Reason for Consult


Consult date: 08/05/19


Reason for consult: Mental Health Evaluation


Requesting physician: SERAFIN PEÑA





- Chief Complaint


Chief complaint: 


"I want to go to a  hotel"








- History of Present Psychiatric Illness


44 y.o. AA female who presented to the ER for SI's. This patient is known to me.

Today the patient was calm, but disorganized during the assessment. Her answers 

to most questions were not logical. She is adamant about going to a hotel to 

take care of herself. Also, she stated something about going to [place] so 

they can help her with housing. She stated that she has several "sores" on her 

feet, I the provider saw 2 small blisters on 2 of her toes. The patient stated 

that she drove from [place], [place] to seek "services" for all her concerns. She 

stated that she have been complaint with her psy medications (Cogentin and 

Zyprexa). She denies SI/HI's and AVH's. She denies a poor appetite, but would 

not confirm or deny erratic sleep. She denies recreational drug use and alcohol 

consumption (etoh). Overall, the patient's behavior is bizarre.








Medications and Allergies


                                    Allergies











Allergy/AdvReac Type Severity Reaction Status Date / Time


 


No Known Allergies Allergy   Verified 05/01/17 05:08











                                Home Medications











 Medication  Instructions  Recorded  Confirmed  Last Taken  Type


 


Benztropine [Cogentin] 2 mg PO DAILY 02/20/17 08/04/19 Unknown History


 


Phenytoin (25 mg/ml) [Dilantin] 100 mg PO Q8HR #90 tab 05/06/17 08/04/19 Unknown

 Rx


 


FLUoxetine HCL [Prozac] 20 mg PO DAILY 05/30/18 08/04/19 Unknown History


 


OLANZapine 30 mg PO HS 05/30/18 08/04/19 Unknown History


 


traZODone [Desyrel] 100 mg PO QHS 05/30/18 08/04/19 Unknown History


 


amLODIPine [Norvasc] 10 mg PO QDAY #30 tablet 12/07/18 08/04/19 Unknown Rx


 


hydroCHLOROthiazide [HCTZ] 25 mg PO QDAY #30 tablet 12/07/18 08/04/19 Unknown Rx











Active Meds: 


Active Medications





Amlodipine Besylate (Norvasc)  5 mg PO DAILY SADAF


   Stop: 08/08/19 21:59


   Last Admin: 08/04/19 22:43 Dose:  5 mg


   Documented by: 


Ziprasidone (Geodon)  20 mg IM Q12H PRN


   PRN Reason: Agitation


   Stop: 08/07/19 20:09


   Last Admin: 08/04/19 20:11 Dose:  20 mg


   Documented by: 











Past psychiatric history





- Past Medical History


Past Medical History: No medical history


Past Surgical History: No surgical history





- past Psychiatric treatment and history


psychiatric treatment history: 


Several inpatient psy services. Denies a fam psy hx. 








- Social History


Social history: other (Homeless)





Mental Status Exam





- Vital signs


                                Last Vital Signs











Temp  97.8 F   08/05/19 07:00


 


Pulse  108 H  08/05/19 07:00


 


Resp  18   08/05/19 07:00


 


BP  139/100   08/05/19 07:00


 


Pulse Ox  100   08/05/19 07:00














- Exam


Narrative exam: 


MSE:





 Appearance: calm  


 Behavior: regular eye contact    


 Speech: regular rate and tone   


 Mood: "okay"


 Affect: congruent to mood 


 Thought Process: disorganized        


 Thought Content: denies SI/HI's and AVH's, delusional  


 Motor Activity: sitting up in bed


 Cognition: A/O x3


 Insight: poor 


 Judgment: poor      





































































































  























  























Results


Result Diagrams: 


                                 08/04/19 17:39





                                 08/04/19 17:39


                              Abnormal lab results











  08/04/19 08/04/19 08/04/19 Range/Units





  17:39 17:39 17:39 


 


Plt Count     (140-440)  K/mm3


 


Mono % (Auto)     (0.0-7.3)  %


 


Potassium    2.9 L*  (3.6-5.0)  mmol/L


 


BUN    5 L  (7-17)  mg/dL


 


Glucose    138 H  ()  mg/dL


 


Ur Specific Gravity     (1.003-1.030)  


 


Urine WBC (Auto)     (0.0-6.0)  /HPF


 


Salicylates  < 0.3 L    (2.8-20.0)  mg/dL


 


Acetaminophen   < 5.0 L   (10.0-30.0)  ug/mL














  08/04/19 08/04/19 Range/Units





  17:39 Unknown 


 


Plt Count  498 H   (140-440)  K/mm3


 


Mono % (Auto)  8.9 H   (0.0-7.3)  %


 


Potassium    (3.6-5.0)  mmol/L


 


BUN    (7-17)  mg/dL


 


Glucose    ()  mg/dL


 


Ur Specific Gravity   1.033 H  (1.003-1.030)  


 


Urine WBC (Auto)   8.0 H  (0.0-6.0)  /HPF


 


Salicylates    (2.8-20.0)  mg/dL


 


Acetaminophen    (10.0-30.0)  ug/mL








All other labs normal.








Assessment and Plan


Assessment and plan: 


Impression:Unspecified Psychosis. Today the patient was cam, but disorganized 

during the assessment. UDS was negative .    





DDx: Schizophrenia, Bipolar DO with psychosis 





Recommendation/Plan: Continue 1013 and start Zyprexa 5 mg PO HS for psychosis 

and start Cogentin 0.5 mg Po HS for EPS prevention. Discussed possible metabolic

 side effects of Zyprexa with the patient, she verbalized understanding. 

Baseline A1c/lipid pane; ordered.     





Dispo: The patient was referred to inpatient psy services.  





Will staff with Dr. SANDRA Byers.

## 2021-02-24 ENCOUNTER — HOSPITAL ENCOUNTER (EMERGENCY)
Dept: HOSPITAL 5 - ED | Age: 46
Discharge: HOME | End: 2021-02-24
Payer: MEDICARE

## 2021-02-24 VITALS — DIASTOLIC BLOOD PRESSURE: 114 MMHG | SYSTOLIC BLOOD PRESSURE: 152 MMHG

## 2021-02-24 DIAGNOSIS — Z79.899: ICD-10-CM

## 2021-02-24 DIAGNOSIS — I10: Primary | ICD-10-CM

## 2021-02-24 DIAGNOSIS — Z76.0: ICD-10-CM

## 2021-02-24 DIAGNOSIS — Z88.8: ICD-10-CM

## 2021-02-24 DIAGNOSIS — F25.0: ICD-10-CM

## 2021-02-24 PROCEDURE — 99281 EMR DPT VST MAYX REQ PHY/QHP: CPT

## 2021-02-24 NOTE — EMERGENCY DEPARTMENT REPORT
ED Recheck HPI





- General


Chief Complaint: Medical Clearance


Stated Complaint: MEDICATION REFILL


Time Seen by Provider: 02/24/21 16:06


Source: patient


Mode of arrival: Ambulatory


Limitations: No Limitations





- History of Present Illness


Initial Comments: 





This is a 45-year-old female nontoxic, well nourished in appearance, no acute 

signs of distress presents to the ED with c/o of haloperidol and benztropine 

medication refill.  Patient stated last taken yesterday.  Patient denies any 

suicidal homicidal ideation.  Denies any visual or auditory hallucination.  

Patient otherwise denies any symptoms.  Denies any chest pain, shortness of 

breath, fever, chills, nausea, medical medical stiff neck.  Patient stated has a

doctor's appointment that scheduled March 11.


MD Complaint: medication refill request


-: days(s)


Returns Today for: request for prescription


Symptoms Since Prior Visit: no new symptoms


Associated Symptoms: none.  denies: fever, chills, chest pain, shortness of 

breath, rash, malaise, nasuea, abdominal pain





- Related Data


                                Home Medications











 Medication  Instructions  Recorded  Confirmed  Last Taken


 


Benztropine [Cogentin] 2 mg PO DAILY 02/20/17 08/04/19 Unknown


 


FLUoxetine HCL [Prozac] 20 mg PO DAILY 05/30/18 08/04/19 Unknown


 


OLANZapine 30 mg PO HS 05/30/18 08/04/19 Unknown


 


traZODone [Desyrel] 100 mg PO QHS 05/30/18 08/04/19 Unknown








                                  Previous Rx's











 Medication  Instructions  Recorded  Last Taken  Type


 


Phenytoin (25 mg/ml) [Dilantin] 100 mg PO Q8HR #90 tab 05/06/17 Unknown Rx


 


hydroCHLOROthiazide [HCTZ] 25 mg PO QDAY #30 tablet 12/07/18 Unknown Rx


 


amLODIPine 10 mg PO QDAY #30 tablet 07/30/20 Unknown Rx


 


Benztropine [Cogentin] 1 mg PO QHS #30 tab 02/24/21 Unknown Rx


 


haloperidoL [Haloperidol] 20 mg PO BID #60 tablet 02/24/21 Unknown Rx











                                    Allergies











Allergy/AdvReac Type Severity Reaction Status Date / Time


 


risperidone [From Risperdal] AdvReac  other Verified 02/24/21 16:09














ED Review of Systems


ROS: 


Stated complaint: MEDICATION REFILL


Other details as noted in HPI





Comment: All other systems reviewed and negative


Constitutional: denies: chills, fever


Eyes: denies: eye pain, eye discharge, vision change


ENT: denies: ear pain, throat pain


Respiratory: denies: cough, shortness of breath, wheezing


Cardiovascular: denies: chest pain, palpitations


Endocrine: no symptoms reported


Gastrointestinal: denies: abdominal pain, nausea, diarrhea


Genitourinary: denies: urgency, dysuria, discharge


Musculoskeletal: denies: back pain, joint swelling, arthralgia


Skin: denies: rash, lesions


Neurological: denies: headache, weakness, paresthesias


Psychiatric: denies: anxiety, depression


Hematological/Lymphatic: denies: easy bleeding, easy bruising





ED Past Medical Hx





- Past Medical History


Hx Hypertension: Yes


Hx Congestive Heart Failure: No


Hx Diabetes: No


Hx Psychiatric Treatment: Yes (bipolar, schziophrenia)


Hx Asthma: No


Hx COPD: No





- Surgical History


Additional Surgical History: Hysterectomy





- Social History


Smoking Status: Never Smoker


Substance Use Type: None





- Medications


Home Medications: 


                                Home Medications











 Medication  Instructions  Recorded  Confirmed  Last Taken  Type


 


Benztropine [Cogentin] 2 mg PO DAILY 02/20/17 08/04/19 Unknown History


 


Phenytoin (25 mg/ml) [Dilantin] 100 mg PO Q8HR #90 tab 05/06/17 08/04/19 Unknown

 Rx


 


FLUoxetine HCL [Prozac] 20 mg PO DAILY 05/30/18 08/04/19 Unknown History


 


OLANZapine 30 mg PO HS 05/30/18 08/04/19 Unknown History


 


traZODone [Desyrel] 100 mg PO QHS 05/30/18 08/04/19 Unknown History


 


hydroCHLOROthiazide [HCTZ] 25 mg PO QDAY #30 tablet 12/07/18 08/04/19 Unknown Rx


 


amLODIPine 10 mg PO QDAY #30 tablet 07/30/20  Unknown Rx


 


Benztropine [Cogentin] 1 mg PO QHS #30 tab 02/24/21  Unknown Rx


 


haloperidoL [Haloperidol] 20 mg PO BID #60 tablet 02/24/21  Unknown Rx














ED Physical Exam





- General


Limitations: No Limitations


General appearance: alert, in no apparent distress





- Head


Head exam: Present: atraumatic, normocephalic





- Eye


Eye exam: Present: normal appearance





- Neck


Neck exam: Present: normal inspection, full ROM





- Respiratory


Respiratory exam: Absent: respiratory distress





- Cardiovascular


Cardiovascular Exam: Present: regular rate





- Extremities Exam


Extremities exam: Present: full ROM





- Back Exam


Back exam: Present: full ROM





- Neurological Exam


Neurological exam: Present: alert, oriented X3, normal gait





- Psychiatric


Psychiatric exam: Present: normal affect, normal mood.  Absent: depressed, 

agitated, anxious, flat affect, manic, homicidal ideation, suicidal ideation





- Skin


Skin exam: Present: warm, dry, intact, normal color.  Absent: rash





ED Course





                                   Vital Signs











  02/24/21





  16:07


 


Temperature 97.5 F L


 


Pulse Rate 89


 


Respiratory 18





Rate 


 


Blood Pressure 152/114


 


O2 Sat by Pulse 99





Oximetry 














- Reevaluation(s)


Reevaluation #1: 





02/24/21 16:11


Patient is speaking in full sentences with no signs of distress noted.





ED Recheck MDM





- Medical Decision Making





45-year-old female that presents for medication refill.  Patient is stable and 

was examined by me.  Vital signs are unremarkable.  Patient has her empty 

medication bottle.  Patient does have a PCP which has an appointment on March 11

 so I will refill medication at this time.  Physical exam is unremarkable.  

Patient was instructed to follow-up with a primary care doctor in 3-5 days or if

 symptoms worsen and continue return to emergency room as soon as possible.  At 

time of discharge, the patient does not seem toxic or ill in appearance.  No 

acute signs of distress noted.  Patient agrees to discharge treatment plan of 

care.  No further questions noted by the patient.


Critical care attestation.: 


If time is entered above; I have spent that time in minutes in the direct care 

of this critically ill patient, excluding procedure time.








ED Disposition


Clinical Impression: 


 Medication refill





Disposition: DC-01 TO HOME OR SELFCARE


Is pt being admited?: No


Does the pt Need Aspirin: No


Condition: Stable


Additional Instructions: 


Follow-up with a primary care doctor in 3-5 days or if symptoms worsen and 

continue return to emergency room as soon as possible. 


Prescriptions: 


Benztropine [Cogentin] 1 mg PO QHS #30 tab


haloperidoL [Haloperidol] 20 mg PO BID #60 tablet


Referrals: 


PRIMARY CARE,MD [Referring] - 3-5 Days


TIERRA GORDON MD [Staff Physician] - 3-5 Days


Time of Disposition: 16:28

## 2021-04-29 ENCOUNTER — HOSPITAL ENCOUNTER (EMERGENCY)
Dept: HOSPITAL 5 - ED | Age: 46
Discharge: PSYCHIATRIC OR SUBSTANCE USE FACILITY | End: 2021-04-29
Payer: MEDICARE

## 2021-04-29 VITALS — DIASTOLIC BLOOD PRESSURE: 102 MMHG | SYSTOLIC BLOOD PRESSURE: 150 MMHG

## 2021-04-29 DIAGNOSIS — Z88.8: ICD-10-CM

## 2021-04-29 DIAGNOSIS — Z79.899: ICD-10-CM

## 2021-04-29 DIAGNOSIS — F31.9: ICD-10-CM

## 2021-04-29 DIAGNOSIS — Z90.710: ICD-10-CM

## 2021-04-29 DIAGNOSIS — I10: Primary | ICD-10-CM

## 2021-04-29 DIAGNOSIS — F20.9: ICD-10-CM

## 2021-04-29 LAB
BASOPHILS # (AUTO): 0 K/MM3 (ref 0–0.1)
BASOPHILS NFR BLD AUTO: 0.3 % (ref 0–1.8)
BUN SERPL-MCNC: 11 MG/DL (ref 7–17)
BUN/CREAT SERPL: 16 %
CALCIUM SERPL-MCNC: 9.7 MG/DL (ref 8.4–10.2)
EOSINOPHIL # BLD AUTO: 0.1 K/MM3 (ref 0–0.4)
EOSINOPHIL NFR BLD AUTO: 1 % (ref 0–4.3)
HCT VFR BLD CALC: 44.1 % (ref 30.3–42.9)
HEMOLYSIS INDEX: 6
HGB BLD-MCNC: 15 GM/DL (ref 10.1–14.3)
LYMPHOCYTES # BLD AUTO: 2 K/MM3 (ref 1.2–5.4)
LYMPHOCYTES NFR BLD AUTO: 28.8 % (ref 13.4–35)
MCHC RBC AUTO-ENTMCNC: 34 % (ref 30–34)
MCV RBC AUTO: 87 FL (ref 79–97)
MONOCYTES # (AUTO): 0.6 K/MM3 (ref 0–0.8)
MONOCYTES % (AUTO): 8.4 % (ref 0–7.3)
PLATELET # BLD: 419 K/MM3 (ref 140–440)
RBC # BLD AUTO: 5.06 M/MM3 (ref 3.65–5.03)

## 2021-04-29 PROCEDURE — 80048 BASIC METABOLIC PNL TOTAL CA: CPT

## 2021-04-29 PROCEDURE — 36415 COLL VENOUS BLD VENIPUNCTURE: CPT

## 2021-04-29 PROCEDURE — 85025 COMPLETE CBC W/AUTO DIFF WBC: CPT

## 2021-04-29 NOTE — EMERGENCY DEPARTMENT REPORT
HPI





- General


Chief Complaint: High BP


Time Seen by Provider: 04/29/21 09:32





- HPI


HPI: 





Room 3








The patient is a 46-year-old female present with a chief complaint of 

hypertension.  Patient was brought from [facility] for hypertension.  

Patient was found to be hypertensive at 195/136.  The patient has a history of 

schizophrenia and hypertension and is currently at [facility] for psychosis.





ED Past Medical Hx





- Past Medical History


Hx Hypertension: Yes


Hx Psychiatric Treatment: Yes (bipolar, schziophrenia)





- Surgical History


Additional Surgical History: Hysterectomy





- Family History


Family history: no significant





- Social History


Smoking Status: Never Smoker


Substance Use Type: None





- Medications


Home Medications: 


                                Home Medications











 Medication  Instructions  Recorded  Confirmed  Last Taken  Type


 


Benztropine [Cogentin] 2 mg PO DAILY 02/20/17 08/04/19 Unknown History


 


Phenytoin (25 mg/ml) [Dilantin] 100 mg PO Q8HR #90 tab 05/06/17 08/04/19 Unknown

 Rx


 


FLUoxetine HCL [Prozac] 20 mg PO DAILY 05/30/18 08/04/19 Unknown History


 


OLANZapine 30 mg PO HS 05/30/18 08/04/19 Unknown History


 


traZODone [Desyrel] 100 mg PO QHS 05/30/18 08/04/19 Unknown History


 


hydroCHLOROthiazide [HCTZ] 25 mg PO QDAY #30 tablet 12/07/18 08/04/19 Unknown Rx


 


Benztropine [Cogentin] 1 mg PO QHS #30 tab 02/24/21  Unknown Rx


 


haloperidoL [Haloperidol] 20 mg PO BID #60 tablet 02/24/21  Unknown Rx


 


amLODIPine 10 mg PO QDAY #90 tablet 04/29/21  Unknown Rx














ED Review of Systems


ROS: 


Stated complaint: HTN


Other details as noted in HPI





Constitutional: no symptoms reported


Eyes: denies: eye pain


ENT: denies: throat pain


Respiratory: no symptoms reported


Cardiovascular: denies: chest pain


Endocrine: no symptoms reported


Gastrointestinal: denies: abdominal pain


Genitourinary: denies: dysuria


Musculoskeletal: denies: back pain


Neurological: denies: headache





Physical Exam





- Physical Exam


Vital Signs: 


                                   Vital Signs











  04/29/21





  09:11


 


Pulse Rate 94 H


 


Respiratory 16





Rate 


 


Blood Pressure 176/103





[Left] 


 


O2 Sat by Pulse 100





Oximetry 








                                   Vital Signs











  04/29/21 04/29/21 04/29/21





  09:09 09:11 09:16


 


Pulse Rate 88 94 H 91 H


 


Respiratory 17 16 15





Rate   


 


Blood Pressure   176/103


 


Blood Pressure  176/103 





[Left]   


 


O2 Sat by Pulse 100 100 99





Oximetry   














  04/29/21 04/29/21 04/29/21





  09:30 09:46 09:47


 


Pulse Rate 91 H 97 H 94 H


 


Respiratory 15 20 





Rate   


 


Blood Pressure 176/103 189/119 189/119


 


Blood Pressure   





[Left]   


 


O2 Sat by Pulse 98 99 





Oximetry   














  04/29/21 04/29/21 04/29/21





  10:01 10:15 10:31


 


Pulse Rate 93 H 93 H 91 H


 


Respiratory 16 18 18





Rate   


 


Blood Pressure  181/124 181/124


 


Blood Pressure   





[Left]   


 


O2 Sat by Pulse 99 99 99





Oximetry   














  04/29/21 04/29/21 04/29/21





  10:45 11:01 11:15


 


Pulse Rate 93 H 94 H 95 H


 


Respiratory 18 21 18





Rate   


 


Blood Pressure 179/114 179/114 167/114


 


Blood Pressure   





[Left]   


 


O2 Sat by Pulse 99 99 99





Oximetry   














  04/29/21 04/29/21 04/29/21





  11:31 11:45 12:01


 


Pulse Rate 100 H 107 H 100 H


 


Respiratory 20 15 14





Rate   


 


Blood Pressure 167/114 150/102 150/102


 


Blood Pressure   





[Left]   


 


O2 Sat by Pulse 99 99 99





Oximetry   











Physical Exam: 





GENERAL: The patient is well-developed well-nourished female lying on stretcher 

not appearing to be in acute distress.  Flat affect


HEENT: Normocephalic.  Atraumatic.  Extraocular motions are intact.  Patient has

 moist mucous membranes.


NECK: Supple.  No meningitic signs are noted.  Trachea midline


CHEST/LUNGS: Clear to auscultation.  There is no respiratory distress noted.


HEART/CARDIOVASCULAR: Regular.  There is no tachycardia.  There is no gallop rub

 or murmur.


ABDOMEN: Abdomen is soft, nontender.  Patient has normal bowel sounds.  There is

 no abdominal distention.


SKIN: There is no rash.  There is no edema.  There is no diaphoresis.


NEURO: The patient is awake and alert.  The patient is cooperative.  The patient

 has no focal neurologic deficits.  The patient has normal speech.  Cranial 

nerves II through XII grossly intact.


MUSCULOSKELETAL: There is no evidence of acute injury.





ED Course


                                   Vital Signs











  04/29/21





  09:11


 


Pulse Rate 94 H


 


Respiratory 16





Rate 


 


Blood Pressure 176/103





[Left] 


 


O2 Sat by Pulse 100





Oximetry 














ED Medical Decision Making





- Differential Diagnosis


Hypertension


Critical care attestation.: 


If time is entered above; I have spent that time in minutes in the direct care 

of this critically ill patient, excluding procedure time.








ED Disposition


Clinical Impression: 


 Hypertension





Disposition: DC/TX-65 PSY HOSP/PSY UNIT


Is pt being admited?: No


Does the pt Need Aspirin: No


Condition: Stable


Instructions:  Hypertension (ED), Hypertension, Adult, Easy-to-Read


Additional Instructions: 


Return to the emergency department should you develop worsening symptoms, 

inability to tolerate food or liquids, high fever or any other concerns


Prescriptions: 


amLODIPine 10 mg PO QDAY #90 tablet


Referrals: 


PRIMARY CARE,MD [Primary Care Provider] - 3-5 Days


Time of Disposition: 12:42

## 2021-05-14 ENCOUNTER — HOSPITAL ENCOUNTER (EMERGENCY)
Dept: HOSPITAL 5 - ED | Age: 46
Discharge: LEFT BEFORE DISCHARGE | End: 2021-05-14
Payer: MEDICARE

## 2021-05-14 DIAGNOSIS — F20.9: Primary | ICD-10-CM

## 2021-05-14 DIAGNOSIS — Z53.21: ICD-10-CM

## 2021-05-14 DIAGNOSIS — Z76.0: ICD-10-CM

## 2021-06-01 ENCOUNTER — HOSPITAL ENCOUNTER (EMERGENCY)
Dept: HOSPITAL 5 - ED | Age: 46
LOS: 1 days | Discharge: PSYCHIATRIC OR SUBSTANCE USE FACILITY | End: 2021-06-02
Payer: MEDICARE

## 2021-06-01 DIAGNOSIS — Z79.899: ICD-10-CM

## 2021-06-01 DIAGNOSIS — F20.9: ICD-10-CM

## 2021-06-01 DIAGNOSIS — I10: ICD-10-CM

## 2021-06-01 DIAGNOSIS — Z88.8: ICD-10-CM

## 2021-06-01 DIAGNOSIS — Z90.710: ICD-10-CM

## 2021-06-01 DIAGNOSIS — Z04.6: ICD-10-CM

## 2021-06-01 DIAGNOSIS — F31.9: ICD-10-CM

## 2021-06-01 DIAGNOSIS — Z91.14: ICD-10-CM

## 2021-06-01 DIAGNOSIS — R00.0: Primary | ICD-10-CM

## 2021-06-01 LAB
BASOPHILS # (AUTO): 0 K/MM3 (ref 0–0.1)
BASOPHILS NFR BLD AUTO: 0.2 % (ref 0–1.8)
BUN SERPL-MCNC: 9 MG/DL (ref 7–17)
BUN/CREAT SERPL: 15 %
CALCIUM SERPL-MCNC: 9.4 MG/DL (ref 8.4–10.2)
EOSINOPHIL # BLD AUTO: 0 K/MM3 (ref 0–0.4)
EOSINOPHIL NFR BLD AUTO: 0.1 % (ref 0–4.3)
HCT VFR BLD CALC: 42.9 % (ref 30.3–42.9)
HEMOLYSIS INDEX: 3
HGB BLD-MCNC: 14.6 GM/DL (ref 10.1–14.3)
INR PPP: 0.89 (ref 0.87–1.13)
LYMPHOCYTES # BLD AUTO: 1.5 K/MM3 (ref 1.2–5.4)
LYMPHOCYTES NFR BLD AUTO: 15.6 % (ref 13.4–35)
MCHC RBC AUTO-ENTMCNC: 34 % (ref 30–34)
MCV RBC AUTO: 88 FL (ref 79–97)
MONOCYTES # (AUTO): 1.1 K/MM3 (ref 0–0.8)
MONOCYTES % (AUTO): 10.7 % (ref 0–7.3)
PLATELET # BLD: 425 K/MM3 (ref 140–440)
RBC # BLD AUTO: 4.9 M/MM3 (ref 3.65–5.03)
T4 FREE SERPL-MCNC: 1.64 NG/DL (ref 0.76–1.46)

## 2021-06-01 PROCEDURE — 85025 COMPLETE CBC W/AUTO DIFF WBC: CPT

## 2021-06-01 PROCEDURE — 96376 TX/PRO/DX INJ SAME DRUG ADON: CPT

## 2021-06-01 PROCEDURE — 80048 BASIC METABOLIC PNL TOTAL CA: CPT

## 2021-06-01 PROCEDURE — 85379 FIBRIN DEGRADATION QUANT: CPT

## 2021-06-01 PROCEDURE — 84443 ASSAY THYROID STIM HORMONE: CPT

## 2021-06-01 PROCEDURE — 36415 COLL VENOUS BLD VENIPUNCTURE: CPT

## 2021-06-01 PROCEDURE — 93005 ELECTROCARDIOGRAM TRACING: CPT

## 2021-06-01 PROCEDURE — 82140 ASSAY OF AMMONIA: CPT

## 2021-06-01 PROCEDURE — 96374 THER/PROPH/DIAG INJ IV PUSH: CPT

## 2021-06-01 PROCEDURE — 82550 ASSAY OF CK (CPK): CPT

## 2021-06-01 PROCEDURE — 85610 PROTHROMBIN TIME: CPT

## 2021-06-01 PROCEDURE — 84439 ASSAY OF FREE THYROXINE: CPT

## 2021-06-01 PROCEDURE — 84484 ASSAY OF TROPONIN QUANT: CPT

## 2021-06-01 NOTE — EMERGENCY DEPARTMENT REPORT
HPI





- General


Chief Complaint: High BP


Time Seen by Provider: 06/01/21 19:48





- HPI


HPI: 





This is a 46-year-old female presents to the emergency department via EMS from 

[facility] with the complaint of hypertension and 

tachycardia.  The patient was admitted to [facility] on 5/28 with an admitting 

diagnosis of paranoid schizophrenia, MDD with psychotic features, and 

hypertension.  It is unknown if the patient is on any antihypertensive 

medication.  When asked what brings the patient to the emergency department she 

says "just put me someplace where I cannot hurt myself."  She denies any chest 

pain, shortness of breath, palpitations.  It does not appear that the patient 

was given anything for symptoms prior to presentation this evening.





ED Past Medical Hx





- Past Medical History


Previous Medical History?: Yes


Hx Hypertension: Yes


Hx Congestive Heart Failure: No


Hx Diabetes: No


Hx Psychiatric Treatment: Yes (bipolar, schziophrenia)


Hx Asthma: No


Hx COPD: No





- Surgical History


Additional Surgical History: Hysterectomy





- Social History


Smoking Status: Never Smoker


Substance Use Type: None





- Medications


Home Medications: 


                                Home Medications











 Medication  Instructions  Recorded  Confirmed  Last Taken  Type


 


Benztropine [Cogentin] 2 mg PO DAILY 02/20/17 08/04/19 Unknown History


 


Phenytoin (25 mg/ml) [Dilantin] 100 mg PO Q8HR #90 tab 05/06/17 08/04/19 Unknown

 Rx


 


FLUoxetine HCL [Prozac] 20 mg PO DAILY 05/30/18 08/04/19 Unknown History


 


OLANZapine 30 mg PO HS 05/30/18 08/04/19 Unknown History


 


traZODone [Desyrel] 100 mg PO QHS 05/30/18 08/04/19 Unknown History


 


hydroCHLOROthiazide [HCTZ] 25 mg PO QDAY #30 tablet 12/07/18 08/04/19 Unknown Rx


 


Benztropine [Cogentin] 1 mg PO QHS #30 tab 02/24/21  Unknown Rx


 


haloperidoL [Haloperidol] 20 mg PO BID #60 tablet 02/24/21  Unknown Rx


 


amLODIPine 10 mg PO QDAY #90 tablet 04/29/21  Unknown Rx


 


labetaloL [Labetalol 100mg TAB] 100 mg PO BID #40 tablet 06/01/21  Unknown Rx














ED Review of Systems


ROS: 


Stated complaint: AMS/TACHYCARDIA


Other details as noted in HPI





Comment: All other systems reviewed and negative


Constitutional: denies: chills, fever


Eyes: denies: eye pain, vision change


ENT: denies: ear pain, throat pain


Respiratory: denies: cough, shortness of breath


Cardiovascular: denies: chest pain, palpitations


Gastrointestinal: denies: abdominal pain, vomiting


Genitourinary: denies: dysuria, discharge


Musculoskeletal: denies: back pain


Skin: denies: rash, lesions


Neurological: denies: headache, weakness





Physical Exam





- Physical Exam


Vital Signs: 


                                   Vital Signs











  06/01/21 06/01/21





  19:01 20:00


 


Temperature 98.6 F 


 


Pulse Rate 115 H 119 H


 


Respiratory 19 18





Rate  


 


Blood Pressure  182/107


 


Blood Pressure 172/111 





[Left]  


 


O2 Sat by Pulse 100 100





Oximetry  











Physical Exam: 





GENERAL: The patient is well-developed well-nourished.


HENT: Normocephalic.  Atraumatic.    Patient has moist mucous membranes.


EYES: Extraocular motions are intact.


NECK: Supple. Trachea is midline.


CHEST/LUNGS: Clear to auscultation.  There is no respiratory distress noted.


HEART/CARDIOVASCULAR: Regular.  There is mild to moderate tachycardia.  There is

 no murmur.


ABDOMEN: Abdomen is soft, nontender.  Patient has normal bowel sounds.  There is

 no abdominal distention.


SKIN: Skin is warm and dry. 


NEURO: The patient is awake, alert, and cooperative.  The patient has no focal 

neurologic deficits.  Normal speech.  Cranial nerves II through XII grossly 

intact.


MUSCULOSKELETAL: There is no tenderness or deformity.  There is no limitation 

range of motion. 





ED Course


                                   Vital Signs











  06/01/21 06/01/21





  19:01 20:00


 


Temperature 98.6 F 


 


Pulse Rate 115 H 119 H


 


Respiratory 19 18





Rate  


 


Blood Pressure  182/107


 


Blood Pressure 172/111 





[Left]  


 


O2 Sat by Pulse 100 100





Oximetry  














ED Medical Decision Making





- Lab Data


Result diagrams: 


                                 06/01/21 20:03





                                 06/01/21 20:03





                                   Lab Results











  06/01/21 06/01/21 06/01/21 Range/Units





  20:03 20:03 20:03 


 


WBC  9.8    (4.5-11.0)  K/mm3


 


RBC  4.90    (3.65-5.03)  M/mm3


 


Hgb  14.6 H    (10.1-14.3)  gm/dl


 


Hct  42.9    (30.3-42.9)  %


 


MCV  88    (79-97)  fl


 


MCH  30    (28-32)  pg


 


MCHC  34    (30-34)  %


 


RDW  13.6    (13.2-15.2)  %


 


Plt Count  425    (140-440)  K/mm3


 


Lymph % (Auto)  15.6    (13.4-35.0)  %


 


Mono % (Auto)  10.7 H    (0.0-7.3)  %


 


Eos % (Auto)  0.1    (0.0-4.3)  %


 


Baso % (Auto)  0.2    (0.0-1.8)  %


 


Lymph # (Auto)  1.5    (1.2-5.4)  K/mm3


 


Mono # (Auto)  1.1 H    (0.0-0.8)  K/mm3


 


Eos # (Auto)  0.0    (0.0-0.4)  K/mm3


 


Baso # (Auto)  0.0    (0.0-0.1)  K/mm3


 


Seg Neutrophils %  73.4 H    (40.0-70.0)  %


 


Seg Neutrophils #  7.2    (1.8-7.7)  K/mm3


 


PT     (12.2-14.9)  Sec.


 


INR     (0.87-1.13)  


 


D-Dimer     (0-234)  ng/mlDDU


 


Sodium   137   (137-145)  mmol/L


 


Potassium   3.1 L   (3.6-5.0)  mmol/L


 


Chloride   98.7   ()  mmol/L


 


Carbon Dioxide   25   (22-30)  mmol/L


 


Anion Gap   16   mmol/L


 


BUN   9   (7-17)  mg/dL


 


Creatinine   0.6   (0.6-1.2)  mg/dL


 


Estimated GFR   > 60   ml/min


 


BUN/Creatinine Ratio   15   %


 


Glucose   138 H   ()  mg/dL


 


Calcium   9.4   (8.4-10.2)  mg/dL


 


Ammonia    41.0  (25-60)  umol/L


 


Total Creatine Kinase     ()  units/L


 


Troponin T     (0.00-0.029)  ng/mL


 


TSH     (0.270-4.200)  mlU/mL


 


Free T4     (0.76-1.46)  ng/dL














  06/01/21 06/01/21 06/01/21 Range/Units





  20:03 20:03 20:03 


 


WBC     (4.5-11.0)  K/mm3


 


RBC     (3.65-5.03)  M/mm3


 


Hgb     (10.1-14.3)  gm/dl


 


Hct     (30.3-42.9)  %


 


MCV     (79-97)  fl


 


MCH     (28-32)  pg


 


MCHC     (30-34)  %


 


RDW     (13.2-15.2)  %


 


Plt Count     (140-440)  K/mm3


 


Lymph % (Auto)     (13.4-35.0)  %


 


Mono % (Auto)     (0.0-7.3)  %


 


Eos % (Auto)     (0.0-4.3)  %


 


Baso % (Auto)     (0.0-1.8)  %


 


Lymph # (Auto)     (1.2-5.4)  K/mm3


 


Mono # (Auto)     (0.0-0.8)  K/mm3


 


Eos # (Auto)     (0.0-0.4)  K/mm3


 


Baso # (Auto)     (0.0-0.1)  K/mm3


 


Seg Neutrophils %     (40.0-70.0)  %


 


Seg Neutrophils #     (1.8-7.7)  K/mm3


 


PT    13.7  (12.2-14.9)  Sec.


 


INR    0.89  (0.87-1.13)  


 


D-Dimer     (0-234)  ng/mlDDU


 


Sodium     (137-145)  mmol/L


 


Potassium     (3.6-5.0)  mmol/L


 


Chloride     ()  mmol/L


 


Carbon Dioxide     (22-30)  mmol/L


 


Anion Gap     mmol/L


 


BUN     (7-17)  mg/dL


 


Creatinine     (0.6-1.2)  mg/dL


 


Estimated GFR     ml/min


 


BUN/Creatinine Ratio     %


 


Glucose     ()  mg/dL


 


Calcium     (8.4-10.2)  mg/dL


 


Ammonia     (25-60)  umol/L


 


Total Creatine Kinase  453 H    ()  units/L


 


Troponin T  < 0.010    (0.00-0.029)  ng/mL


 


TSH   0.790   (0.270-4.200)  mlU/mL


 


Free T4   1.64 H   (0.76-1.46)  ng/dL














  06/01/21 Range/Units





  21:44 


 


WBC   (4.5-11.0)  K/mm3


 


RBC   (3.65-5.03)  M/mm3


 


Hgb   (10.1-14.3)  gm/dl


 


Hct   (30.3-42.9)  %


 


MCV   (79-97)  fl


 


MCH   (28-32)  pg


 


MCHC   (30-34)  %


 


RDW   (13.2-15.2)  %


 


Plt Count   (140-440)  K/mm3


 


Lymph % (Auto)   (13.4-35.0)  %


 


Mono % (Auto)   (0.0-7.3)  %


 


Eos % (Auto)   (0.0-4.3)  %


 


Baso % (Auto)   (0.0-1.8)  %


 


Lymph # (Auto)   (1.2-5.4)  K/mm3


 


Mono # (Auto)   (0.0-0.8)  K/mm3


 


Eos # (Auto)   (0.0-0.4)  K/mm3


 


Baso # (Auto)   (0.0-0.1)  K/mm3


 


Seg Neutrophils %   (40.0-70.0)  %


 


Seg Neutrophils #   (1.8-7.7)  K/mm3


 


PT   (12.2-14.9)  Sec.


 


INR   (0.87-1.13)  


 


D-Dimer  135.00  (0-234)  ng/mlDDU


 


Sodium   (137-145)  mmol/L


 


Potassium   (3.6-5.0)  mmol/L


 


Chloride   ()  mmol/L


 


Carbon Dioxide   (22-30)  mmol/L


 


Anion Gap   mmol/L


 


BUN   (7-17)  mg/dL


 


Creatinine   (0.6-1.2)  mg/dL


 


Estimated GFR   ml/min


 


BUN/Creatinine Ratio   %


 


Glucose   ()  mg/dL


 


Calcium   (8.4-10.2)  mg/dL


 


Ammonia   (25-60)  umol/L


 


Total Creatine Kinase   ()  units/L


 


Troponin T   (0.00-0.029)  ng/mL


 


TSH   (0.270-4.200)  mlU/mL


 


Free T4   (0.76-1.46)  ng/dL














- EKG Data


-: EKG Interpreted by Me


EKG shows normal: sinus rhythm, axis, intervals, QRS complexes (Q waves to the 

septal leads), ST-T waves


Rate: tachycardia (107 bpm)





- EKG Data


When compared to previous EKG there are: previous EKG unavailable


Interpretation: other (Sinus tachycardia 107 bpm, normal axis, normal intervals,

Q waves to the septal leads.  No ST elevation MI.)





- Medical Decision Making





This patient was sent in from her psychiatric facility with a complaint of 

hypertension and tachycardia, as well as some questionable altered mental 

status, after noncompliance with her medications.  Patient does present with a 

blood pressure of 172/111 and a heart rate of about 115.





The patient was given 2 different doses of IV labetalol with improvement in both

the blood pressure and heart rate.  Labs have been mostly unremarkable including

CBC, metabolic panel, ammonia level, TSH, negative troponin and negative D-

dimer.  There was a slight elevation in the free T4 level but nothing so 

significant that I feel there is concern for a thyroid storm or that 

hyperthyroidism is the source of the patient's hypertension and tachycardia.





I do not see the questionable altered mental status that was mentioned on the 

EMS report.  Apparently she was awake but otherwise not responding and nonverbal

when EMS first arrived at [facility].  However the patient has been awake and 

conversive for me during her ED course.  She does express some occasional 

tangential thoughts that may be consistent with her admission to [facility] for 

acute psychosis.  However, the patient has allegedly been noncompliant with her 

psychiatric medications, as well as her antihypertensive medications.  Patient 

was reevaluated multiple times over multiple hours and has remained stable 

throughout her ED course.  The tachycardia has resolved.  The hypertension has 

improved.





The patient appears safe for discharge back to [facility] and has been given a 

prescription for labetalol.





Discharge instructions include the plan to return to the closest emergency 

department with any return or worsening of her symptoms, or with any acute distr

ess.


Critical Care Time: No


Critical care attestation.: 


If time is entered above; I have spent that time in minutes in the direct care 

of this critically ill patient, excluding procedure time.








ED Disposition


Clinical Impression: 


 Medical clearance for psychiatric admission, Tachycardia, Noncompliance with 

medication regimen





Hypertension


Qualifiers:


 Hypertension type: essential hypertension Qualified Code(s): I10 - Essential (p

rimary) hypertension





Disposition: DC/TX-65 PSY HOSP/PSY UNIT


Is pt being admited?: No


Condition: Stable


Instructions:  Sinus Tachycardia, Hypertension, Adult, Hypertension (ED)


Additional Instructions: 


Please follow-up with a primary care physician once you are done with your 

psychiatric treatment.





Take all medications as prescribed.





Try to stay away from foods that are high in salt and caffeinated products to 

help with your blood pressure.  Keep a blood pressure log.





Return to the emergency department with any worsening of your symptoms, new or 

concerning symptoms not addressed during this current emergency department 

visit, or with any acute distress.


Prescriptions: 


labetaloL [Labetalol 100mg TAB] 100 mg PO BID #40 tablet


Referrals: 


DARRELL LEE MD [Staff Physician] - 3-5 Days


[facility] [Provider Group] - 3-5 Days


Time of Disposition: 22:16

## 2021-06-02 VITALS — SYSTOLIC BLOOD PRESSURE: 145 MMHG | DIASTOLIC BLOOD PRESSURE: 91 MMHG

## 2021-06-02 NOTE — ELECTROCARDIOGRAPH REPORT
[facility], [place]

                                       

Test Date:    2021               Test Time:    20:43:27

Pat Name:     FIDEL HUTTON        Department:   

Patient ID:   [id number]          Room:          

Gender:       F                        Technician:   ED TECH

:          1975               Requested By: MICHAEL AYALA

Order Number: [id number]              Reading MD:   Ferny Edmonds

                                 Measurements

Intervals                              Axis          

Rate:         107                      P:            73

WA:           155                      QRS:          78

QRSD:         84                       T:            34

QT:           346                                    

QTc:          461                                    

                           Interpretive Statements

Sinus tachycardia

Biatrial enlargement

Consider anteroseptal infarct

No previous ECG available for comparison

Electronically Signed On 2021 10:16:13 EDT by Ferny Edmonds